# Patient Record
Sex: MALE | Race: WHITE | NOT HISPANIC OR LATINO | Employment: FULL TIME | URBAN - METROPOLITAN AREA
[De-identification: names, ages, dates, MRNs, and addresses within clinical notes are randomized per-mention and may not be internally consistent; named-entity substitution may affect disease eponyms.]

---

## 2023-05-04 ENCOUNTER — APPOINTMENT (OUTPATIENT)
Dept: CT IMAGING | Facility: CLINIC | Age: 63
DRG: 065 | End: 2023-05-04
Attending: EMERGENCY MEDICINE
Payer: COMMERCIAL

## 2023-05-04 ENCOUNTER — APPOINTMENT (OUTPATIENT)
Dept: CARDIOLOGY | Facility: CLINIC | Age: 63
DRG: 065 | End: 2023-05-04
Attending: PHYSICIAN ASSISTANT
Payer: COMMERCIAL

## 2023-05-04 ENCOUNTER — HOSPITAL ENCOUNTER (INPATIENT)
Facility: CLINIC | Age: 63
LOS: 1 days | Discharge: HOME | DRG: 065 | End: 2023-05-05
Attending: EMERGENCY MEDICINE | Admitting: HOSPITALIST
Payer: COMMERCIAL

## 2023-05-04 ENCOUNTER — APPOINTMENT (OUTPATIENT)
Dept: MRI IMAGING | Facility: CLINIC | Age: 63
DRG: 065 | End: 2023-05-04
Attending: EMERGENCY MEDICINE
Payer: COMMERCIAL

## 2023-05-04 DIAGNOSIS — I67.1 CEREBRAL ANEURYSM, NONRUPTURED: ICD-10-CM

## 2023-05-04 DIAGNOSIS — N40.1 BENIGN PROSTATIC HYPERPLASIA WITH LOWER URINARY TRACT SYMPTOMS, SYMPTOM DETAILS UNSPECIFIED: ICD-10-CM

## 2023-05-04 DIAGNOSIS — I10 BENIGN ESSENTIAL HYPERTENSION: ICD-10-CM

## 2023-05-04 DIAGNOSIS — I63.9 CEREBROVASCULAR ACCIDENT (CVA), UNSPECIFIED MECHANISM (H): Primary | ICD-10-CM

## 2023-05-04 DIAGNOSIS — I63.9 ACUTE STROKE OF BASAL GANGLIA (H): ICD-10-CM

## 2023-05-04 LAB
ALBUMIN SERPL BCG-MCNC: 4 G/DL (ref 3.5–5.2)
ALP SERPL-CCNC: 102 U/L (ref 40–129)
ALT SERPL W P-5'-P-CCNC: 17 U/L (ref 10–50)
ANION GAP SERPL CALCULATED.3IONS-SCNC: 8 MMOL/L (ref 7–15)
AST SERPL W P-5'-P-CCNC: 25 U/L (ref 10–50)
BASOPHILS # BLD AUTO: 0.1 10E3/UL (ref 0–0.2)
BASOPHILS NFR BLD AUTO: 1 %
BILIRUB DIRECT SERPL-MCNC: <0.2 MG/DL (ref 0–0.3)
BILIRUB SERPL-MCNC: 0.4 MG/DL
BUN SERPL-MCNC: 18.4 MG/DL (ref 8–23)
CALCIUM SERPL-MCNC: 8.8 MG/DL (ref 8.8–10.2)
CHLORIDE SERPL-SCNC: 98 MMOL/L (ref 98–107)
CHOLEST SERPL-MCNC: 150 MG/DL
CREAT SERPL-MCNC: 1.29 MG/DL (ref 0.67–1.17)
DEPRECATED HCO3 PLAS-SCNC: 27 MMOL/L (ref 22–29)
EOSINOPHIL # BLD AUTO: 0.1 10E3/UL (ref 0–0.7)
EOSINOPHIL NFR BLD AUTO: 1 %
ERYTHROCYTE [DISTWIDTH] IN BLOOD BY AUTOMATED COUNT: 13.1 % (ref 10–15)
GFR SERPL CREATININE-BSD FRML MDRD: 63 ML/MIN/1.73M2
GLUCOSE BLDC GLUCOMTR-MCNC: 107 MG/DL (ref 70–99)
GLUCOSE BLDC GLUCOMTR-MCNC: 109 MG/DL (ref 70–99)
GLUCOSE BLDC GLUCOMTR-MCNC: 109 MG/DL (ref 70–99)
GLUCOSE SERPL-MCNC: 102 MG/DL (ref 70–99)
HBA1C MFR BLD: 5.9 %
HCT VFR BLD AUTO: 47 % (ref 40–53)
HDLC SERPL-MCNC: 41 MG/DL
HGB BLD-MCNC: 15.5 G/DL (ref 13.3–17.7)
IMM GRANULOCYTES # BLD: 0 10E3/UL
IMM GRANULOCYTES NFR BLD: 0 %
LDLC SERPL CALC-MCNC: 96 MG/DL
LVEF ECHO: NORMAL
LYMPHOCYTES # BLD AUTO: 1.1 10E3/UL (ref 0.8–5.3)
LYMPHOCYTES NFR BLD AUTO: 15 %
MCH RBC QN AUTO: 30 PG (ref 26.5–33)
MCHC RBC AUTO-ENTMCNC: 33 G/DL (ref 31.5–36.5)
MCV RBC AUTO: 91 FL (ref 78–100)
MONOCYTES # BLD AUTO: 0.5 10E3/UL (ref 0–1.3)
MONOCYTES NFR BLD AUTO: 7 %
NEUTROPHILS # BLD AUTO: 5.6 10E3/UL (ref 1.6–8.3)
NEUTROPHILS NFR BLD AUTO: 76 %
NONHDLC SERPL-MCNC: 109 MG/DL
NRBC # BLD AUTO: 0 10E3/UL
NRBC BLD AUTO-RTO: 0 /100
PLATELET # BLD AUTO: 246 10E3/UL (ref 150–450)
POTASSIUM SERPL-SCNC: 4.4 MMOL/L (ref 3.4–5.3)
PROT SERPL-MCNC: 7.2 G/DL (ref 6.4–8.3)
RBC # BLD AUTO: 5.16 10E6/UL (ref 4.4–5.9)
SODIUM SERPL-SCNC: 133 MMOL/L (ref 136–145)
TRIGL SERPL-MCNC: 67 MG/DL
TROPONIN T SERPL HS-MCNC: 22 NG/L
TSH SERPL DL<=0.005 MIU/L-ACNC: 0.69 UIU/ML (ref 0.3–4.2)
WBC # BLD AUTO: 7.4 10E3/UL (ref 4–11)

## 2023-05-04 PROCEDURE — 84443 ASSAY THYROID STIM HORMONE: CPT | Performed by: PHYSICIAN ASSISTANT

## 2023-05-04 PROCEDURE — 258N000003 HC RX IP 258 OP 636: Performed by: EMERGENCY MEDICINE

## 2023-05-04 PROCEDURE — 82040 ASSAY OF SERUM ALBUMIN: CPT | Performed by: PHYSICIAN ASSISTANT

## 2023-05-04 PROCEDURE — 83036 HEMOGLOBIN GLYCOSYLATED A1C: CPT | Performed by: PHYSICIAN ASSISTANT

## 2023-05-04 PROCEDURE — 99223 1ST HOSP IP/OBS HIGH 75: CPT | Mod: AI | Performed by: PHYSICIAN ASSISTANT

## 2023-05-04 PROCEDURE — 99285 EMERGENCY DEPT VISIT HI MDM: CPT | Mod: 25

## 2023-05-04 PROCEDURE — 99207 PR NO BILLABLE SERVICE THIS VISIT: CPT | Performed by: NURSE PRACTITIONER

## 2023-05-04 PROCEDURE — 82962 GLUCOSE BLOOD TEST: CPT

## 2023-05-04 PROCEDURE — 250N000013 HC RX MED GY IP 250 OP 250 PS 637: Performed by: PHYSICIAN ASSISTANT

## 2023-05-04 PROCEDURE — 250N000013 HC RX MED GY IP 250 OP 250 PS 637: Performed by: EMERGENCY MEDICINE

## 2023-05-04 PROCEDURE — 70450 CT HEAD/BRAIN W/O DYE: CPT

## 2023-05-04 PROCEDURE — 36415 COLL VENOUS BLD VENIPUNCTURE: CPT | Performed by: EMERGENCY MEDICINE

## 2023-05-04 PROCEDURE — 0042T CT HEAD PERFUSION W CONTRAST: CPT

## 2023-05-04 PROCEDURE — 93005 ELECTROCARDIOGRAM TRACING: CPT

## 2023-05-04 PROCEDURE — 70498 CT ANGIOGRAPHY NECK: CPT

## 2023-05-04 PROCEDURE — 93306 TTE W/DOPPLER COMPLETE: CPT | Mod: 26 | Performed by: INTERNAL MEDICINE

## 2023-05-04 PROCEDURE — 80061 LIPID PANEL: CPT | Performed by: PHYSICIAN ASSISTANT

## 2023-05-04 PROCEDURE — G0378 HOSPITAL OBSERVATION PER HR: HCPCS

## 2023-05-04 PROCEDURE — 70551 MRI BRAIN STEM W/O DYE: CPT

## 2023-05-04 PROCEDURE — 120N000001 HC R&B MED SURG/OB

## 2023-05-04 PROCEDURE — 96360 HYDRATION IV INFUSION INIT: CPT | Mod: 59

## 2023-05-04 PROCEDURE — 93306 TTE W/DOPPLER COMPLETE: CPT

## 2023-05-04 PROCEDURE — 70496 CT ANGIOGRAPHY HEAD: CPT

## 2023-05-04 PROCEDURE — 96361 HYDRATE IV INFUSION ADD-ON: CPT

## 2023-05-04 PROCEDURE — 258N000003 HC RX IP 258 OP 636: Performed by: PHYSICIAN ASSISTANT

## 2023-05-04 PROCEDURE — 85025 COMPLETE CBC W/AUTO DIFF WBC: CPT | Performed by: EMERGENCY MEDICINE

## 2023-05-04 PROCEDURE — 84484 ASSAY OF TROPONIN QUANT: CPT | Performed by: EMERGENCY MEDICINE

## 2023-05-04 PROCEDURE — 250N000011 HC RX IP 250 OP 636: Performed by: EMERGENCY MEDICINE

## 2023-05-04 PROCEDURE — 80048 BASIC METABOLIC PNL TOTAL CA: CPT | Performed by: EMERGENCY MEDICINE

## 2023-05-04 RX ORDER — ASPIRIN 300 MG/1
300 SUPPOSITORY RECTAL DAILY
Status: DISCONTINUED | OUTPATIENT
Start: 2023-05-05 | End: 2023-05-05 | Stop reason: HOSPADM

## 2023-05-04 RX ORDER — ONDANSETRON 4 MG/1
4 TABLET, ORALLY DISINTEGRATING ORAL EVERY 6 HOURS PRN
Status: DISCONTINUED | OUTPATIENT
Start: 2023-05-04 | End: 2023-05-05 | Stop reason: HOSPADM

## 2023-05-04 RX ORDER — TAMSULOSIN HYDROCHLORIDE 0.4 MG/1
0.4 CAPSULE ORAL DAILY
Status: DISCONTINUED | OUTPATIENT
Start: 2023-05-04 | End: 2023-05-05 | Stop reason: HOSPADM

## 2023-05-04 RX ORDER — LISINOPRIL AND HYDROCHLOROTHIAZIDE 12.5; 2 MG/1; MG/1
1 TABLET ORAL DAILY
Status: DISCONTINUED | OUTPATIENT
Start: 2023-05-04 | End: 2023-05-05 | Stop reason: HOSPADM

## 2023-05-04 RX ORDER — ASPIRIN 81 MG/1
324 TABLET, CHEWABLE ORAL DAILY
Status: DISCONTINUED | OUTPATIENT
Start: 2023-05-05 | End: 2023-05-05 | Stop reason: HOSPADM

## 2023-05-04 RX ORDER — ACETAMINOPHEN 325 MG/1
650 TABLET ORAL EVERY 4 HOURS PRN
Status: DISCONTINUED | OUTPATIENT
Start: 2023-05-04 | End: 2023-05-05 | Stop reason: HOSPADM

## 2023-05-04 RX ORDER — ONDANSETRON 2 MG/ML
4 INJECTION INTRAMUSCULAR; INTRAVENOUS EVERY 6 HOURS PRN
Status: DISCONTINUED | OUTPATIENT
Start: 2023-05-04 | End: 2023-05-05 | Stop reason: HOSPADM

## 2023-05-04 RX ORDER — ATORVASTATIN CALCIUM 40 MG/1
40 TABLET, FILM COATED ORAL EVERY EVENING
Status: DISCONTINUED | OUTPATIENT
Start: 2023-05-04 | End: 2023-05-05 | Stop reason: HOSPADM

## 2023-05-04 RX ORDER — LIDOCAINE 40 MG/G
CREAM TOPICAL
Status: DISCONTINUED | OUTPATIENT
Start: 2023-05-04 | End: 2023-05-05 | Stop reason: HOSPADM

## 2023-05-04 RX ORDER — IOPAMIDOL 755 MG/ML
500 INJECTION, SOLUTION INTRAVASCULAR ONCE
Status: COMPLETED | OUTPATIENT
Start: 2023-05-04 | End: 2023-05-04

## 2023-05-04 RX ADMIN — IOPAMIDOL 125 ML: 755 INJECTION, SOLUTION INTRAVENOUS at 10:10

## 2023-05-04 RX ADMIN — ATORVASTATIN CALCIUM 40 MG: 40 TABLET, FILM COATED ORAL at 20:02

## 2023-05-04 RX ADMIN — SODIUM CHLORIDE 100 ML: 9 INJECTION, SOLUTION INTRAVENOUS at 10:10

## 2023-05-04 RX ADMIN — ASPIRIN 325 MG: 325 TABLET, COATED ORAL at 11:45

## 2023-05-04 RX ADMIN — LISINOPRIL AND HYDROCHLOROTHIAZIDE 1 TABLET: 12.5; 2 TABLET ORAL at 16:44

## 2023-05-04 RX ADMIN — TAMSULOSIN HYDROCHLORIDE 0.4 MG: 0.4 CAPSULE ORAL at 16:44

## 2023-05-04 RX ADMIN — SODIUM CHLORIDE 1000 ML: 9 INJECTION, SOLUTION INTRAVENOUS at 15:22

## 2023-05-04 ASSESSMENT — ACTIVITIES OF DAILY LIVING (ADL)
ADLS_ACUITY_SCORE: 18
ADLS_ACUITY_SCORE: 18
ADLS_ACUITY_SCORE: 35
ADLS_ACUITY_SCORE: 18
ADLS_ACUITY_SCORE: 18

## 2023-05-04 ASSESSMENT — VISUAL ACUITY: OU: NORMAL ACUITY

## 2023-05-04 NOTE — H&P
[facility]    History and Physical - Hospitalist Service       Date of Admission:  5/4/2023    Assessment & Plan      Abdifatah Zimmerman is a 62 year old male admitted on 5/4/2023 with acute CVA. He has not sought out medical care for ~20 yrs. He is a smoker, 1/4 pack per day.    1. Acute CVA  Developed R facial droop 2 days ago with slurred speech. Denies one sided weakness or vision change. CT head shows several areas concerning for age indeterminate infarcts. CTA head and neck is negative for high grade stenosis or large vessel occlusion, poss small aneurysm noted to L internal carotid artery, and 40-50% stenosis of the prox R internal carotid artery. CT head perfusion study also abnormal. Brain MRI obtained which does show small acute to subacute infarct to deep left frontal corona radiata. MRI also shows multiple chronic infarcts.   Pt reports hx of one sided weakness causing a fall some time ago. This resolved on its own and he did not seek medical evaluation.   Blood pressure elevated here, suspect underlying HTN. Also a smoker, 1/4 pack per day. Hx of heavy alcohol use, little use in past 20 yrs. Cr elevated, suspect CKD.  - admit to Observation  - sx ongoing for >48 hrs, no indication for thrombolysis   - stroke neurology consult  - monitor on tele  - echocardiogram  - start full dose ASA per neurology recommendation   - start statin, add on lipid panel  - HgbA1c add on, 5.9.   - troponin within normal limits  - blood pressure control. Will start Lisinopril/HCTZ 20mg/12/5 mg, suspect Cr is chronic and at baseline. Will need close monitoring as an outpatient to ensure stability. Will give 1L of NS overnight in case Cr is acutely elevated and due to hyponatremia.   - PT/OT consults  - encourage smoking cessation   - will need to establish care with PCP, SW consult     2. GUERRERO vs CKD  Cr 1.29. pt has not sought medical care for over 20 yrs, does not endorse acute volume loss with diarrhea,  vomiting or blood loss. BP elevated, suspect underlying HTN and hypertensive kidney disease.   - will give 1L NS in case this is acute and reversible.   - starting Lisinopril/HCTZ for HTN, monitor Cr closely  - avoid NSAIDs  - encourage smoking cessation     3. HTN  Suspect underlying HTN. Sx have been ongoing for 2 days, no need for permissive HTN in setting of acute stroke.   - start Lisinopril/HCTZ 20/12.5 (starting with lower dose of hydrochlorothiazide due to hyponatremia). Will likely need titration of antihypertensive as an outpatient   4. Hyponatremia  Will give 1L NS over 10 hrs, recheck in AM  5. Suspect BPH  Pt endorses hx of incomplete emptying and difficulty with urinary control. Suspect BPH. Pt willing to start Flomax now, which would also help with HTN, but understands he needs formal evaluation and work up with PCP as an outpatient.   - start Flomax  - PVRs  6. Tobacco use  Smokes 1/4 pack per day. Encourage cessation. Declines nicotine patch       Diet: Combination Diet Regular Diet    DVT Prophylaxis: Low Risk/Ambulatory with no VTE prophylaxis indicated  Olvera Catheter: Not present  Lines: None     Cardiac Monitoring: ACTIVE order. Indication: Stroke, acute (48 hours)  Code Status: Full Code      Clinically Significant Risk Factors Present on Admission                               Disposition Plan      Expected Discharge Date: 05/05/2023                The patient's care was discussed with the Attending Physician, Dr. Mancilla, Patient and ED provider, Dr. Snell.    Monika Oliveira PA-C  Hospitalist Service  [facility]  Securely message with [employer] (more info)  Text page via [facility] Paging/Directory     ______________________________________________________________________    Chief Complaint   Facial droop    History is obtained from the patient    History of Present Illness   Abdifatah Zimmerman is a 62 year old male who who presents to the ED with right facial droop.   Patient states he developed a right facial droop with slurred speech on Tuesday.  He thought it would go away on its own and did not seek immediate medical attention.  He was seen in clinic today for his symptoms and referred to the ED for concerns of an acute stroke.  The patient denies vision loss, swallowing difficulties, or one-sided weakness.  He denies recent fever, chills, chest pain, palpitations, shortness of breath, abdominal pain, nausea, vomiting, diarrhea or dysuria.  He does note recent issues with incomplete bladder emptying and difficulty controlling his urine.  He has not sought medical care for 20 years so he has no diagnosed medical conditions.  Quite some time ago, he does endorse an episode of one-sided weakness that did cause him to fall.  He states his symptoms just resolved on their own and he did not seek medical attention at that time.  He notes a family history of coronary artery disease in his grandfather and lung cancer in his mother.  He is a smoker, he smokes 1 pack every 4 days.  He endorses a remote history of heavy alcohol use 20 years ago but now only rarely drinks.  In the ED, his blood pressure is elevated, 195/117, vital signs otherwise stable.  BMP shows a sodium of 133, creatinine of 1.29, otherwise unremarkable.  CBC is unremarkable.  A code stroke was called, CT of the head showed multiple small foci of hypoattenuation involving the bilateral basal ganglia regions in the cerebral white matter concerning for age-indeterminate ischemia or infarct.  CTA of the head and neck shows no high-grade stenosis or large vessel occlusion, a small possible aneurysm of the left internal carotid artery noted, 40-50% stenosis of the proximal right internal carotid artery noted, no significant stenosis of the left carotid artery.  CT head perfusion study does show apparent asymmetric prolongation of contrast transit in the superior aspect of the right cerebellar hemisphere on pretreatment  imaging which is likely artifact but unable to rule out ischemia.  MRI of the brain was obtained which did show small acute to early subacute ischemic infarcts involving the deep left frontal white matter/corona radiata, several chronic infarcts also noted.  Stroke neurology was contacted and recommended full dose aspirin and no acute intervention required given symptoms have been present for greater than 48 hours.  He will be admitted to observation for further work-up and management of acute CVA.      Past Medical History    No past medical history on file. No known medical hx    Past Surgical History   No past surgical history on file. No known surgical hx    Prior to Admission Medications   None        Family History     Grandfather - CAD  Mother - lung ca       Physical Exam   Vital Signs: Temp: 97.9  F (36.6  C) Temp src: Oral BP: (!) 176/107 Pulse: 74   Resp: 20 SpO2: 94 % O2 Device: None (Room air)    Weight: 0 lbs 0 oz    GENERAL:  Comfortable.  PSYCH: pleasant, oriented, No acute distress.  HEENT:  Atraumatic, R facial droop. Normal conjunctiva, normal hearing, and oropharynx is normal. Speech is slurred  NECK:  Supple, no neck vein distention.  HEART:  Normal S1, S2 with no murmur, no pericardial rub, gallops or S3 or S4.  LUNGS:  Clear to auscultation, normal Respiratory effort. No wheezing, rales or ronchi.  GI:  Soft, normal bowel sounds. Non-tender, non distended.   EXTREMITIES:  No pedal edema, +2 pulses bilateral and equal.  SKIN:  Dry to touch, No rash, wound or ulcerations.  NEUROLOGIC:  R facial droop and slurred speech, otherwise CN 2-12 intact, BL 5/5 symmetric upper and lower extremity strength, sensation is intact with no focal deficits.      Medical Decision Making       75+ MINUTES SPENT BY ME on the date of service doing chart review, history, exam, documentation & further activities per the note.      Data     I have personally reviewed the following data over the past 24 hrs:    7.4  \    15.5   / 246     133 (L) 98 18.4 /  102 (H)   4.4 27 1.29 (H) \       ALT: 17 AST: 25 AP: 102 TBILI: 0.4   ALB: 4.0 TOT PROTEIN: 7.2 LIPASE: N/A       Trop: 22 BNP: N/A       TSH: 0.69 T4: N/A A1C: 5.9 (H)       Imaging results reviewed over the past 24 hrs:   Recent Results (from the past 24 hour(s))   Head CT w/o contrast    Narrative    CT SCAN OF THE HEAD WITHOUT CONTRAST May 4, 2023 10:15 AM     HISTORY: Stroke.    TECHNIQUE: Axial images of the head and coronal reformations without  IV contrast material. Radiation dose for this scan was reduced using  automated exposure control, adjustment of the mA and/or kV according  to patient size, or iterative reconstruction technique.    COMPARISON: None.    FINDINGS: The ventricles are normal in size and configuration.  Multiple small foci of hypoattenuation are seen in the bilateral basal  ganglia region and also involving the corona radiata and  retrolenticular right internal capsule region, concerning for  age-indeterminate infarcts. Couple of small areas of hypoattenuation  also noted involving the bilateral corona radiata/centrum semiovale  (series 3 image 20), concerning for age-indeterminate ischemic  change/infarct. Elsewhere, mild patchy hypoattenuation in the cerebral  white matter likely representing chronic small vessel ischemic  disease. Mild generalized brain atrophy. No acute intracranial  hemorrhage, extra axial fluid collection, or mass effect.    Mild mucosal thickening in the paranasal sinuses. The mastoid air  cells are clear. The calvarium appears intact.      Impression    IMPRESSION:  1. Multiple small foci of hypoattenuation involving the bilateral  basal ganglia regions and the cerebral white matter, as described,  concerning for age-indeterminate ischemia/infarcts. MRI is recommended  for further evaluation.  2. Brain atrophy and presumed chronic small vessel ischemic change, as  described.  3. No acute intracranial hemorrhage, extra axial  fluid collection, or  mass effect.    KISHAN HEATH MD         SYSTEM ID:  [id number]   CTA Head Neck with Contrast    Narrative    CT ANGIOGRAM OF THE HEAD AND NECK WITH CONTRAST  5/4/2023 10:19 AM     HISTORY: Code stroke.    TECHNIQUE: CT angiography with an injection of 125mL Isovue-370 (CTA  Head Neck 75ml and Perfusion 50ml, accession numbers [id number] and  [id number]) IV with scans through the head and neck. Images were  transferred to a separate 3-D workstation where multiplanar  reformations and 3-D images were created. Estimates of carotid  stenoses are made relative to the distal internal carotid artery  diameters except as noted. Radiation dose for this scan was reduced  using automated exposure control, adjustment of the mA and/or kV  according to patient size, or iterative reconstruction technique.     Perfusion scans were performed with injection of additional IV  contrast. These images were processed on a separate 3-D workstation.     COMPARISON: CT head same date.     CT ANGIOGRAM HEAD FINDINGS: The intracranial internal carotid  arteries, the major proximal branches of the anterior cerebral and  middle cerebral arteries, the vertebral arteries, the basilar artery,  and the major proximal branches of the posterior cerebral arteries  appear patent without flow-limiting stenosis. No large vessel  occlusion identified.    Small inferomedially directed outpouching involving the mid cavernous  segment of the left internal carotid artery measuring approximately  2-3 mm in height, concerning for possible small saccular aneurysm. No  other findings concerning for intracranial aneurysm or high flow  vascular malformation. Expected enhancement of the major dural venous  sinuses.    CT ANGIOGRAM NECK FINDINGS:   Conventional three-vessel aortic arch branching pattern. The origins  of the brachiocephalic and left common carotid arteries are not  included within the field-of-view. The origin of the left  subclavian  artery is patent. The proximal visualized aspects of the  brachiocephalic and left common carotid arteries are patent.    Right carotid artery: There is atherosclerosis involving the carotid  bifurcation and proximal internal carotid artery contributing to  approximately 40-50% stenosis of the proximal internal carotid artery  by NASCET criteria.    Left carotid artery: The left common and internal carotid arteries are  patent. Mild atherosclerotic disease at the carotid bifurcation and  proximal internal carotid artery without significant stenosis by  NASCET criteria.     Vertebral arteries: Vertebral arteries are patent without evidence of  dissection. No significant stenosis.     Other findings: Partially visualized centrilobular and paraseptal  emphysema involving the bilateral upper pulmonary lobes. Multilevel  degenerative changes in the spine.    CT HEAD PERFUSION FINDINGS: There are apparent artifacts in the  posterior fossa that somewhat limit evaluation. Apparent asymmetric  prolongation of contrast transit to the superior aspect of the right  cerebellar hemisphere is favored to be artifact. Otherwise, no  definite focal or regional cerebral perfusion asymmetry is identified.      Impression    IMPRESSION:     CTA HEAD:  1. No high-grade stenosis or large vessel occlusion involving the  major intracranial arteries.  2. Small (approximately 2-3 mm) inferomedially directed focal  outpouching of the cavernous left internal carotid artery, concerning  for possible small aneurysm.    CTA NECK:  1. Approximately 40-50% stenosis of the proximal right internal  carotid artery by NASCET criteria due to atherosclerosis.  2. Atherosclerosis of the left carotid bifurcation without significant  stenosis.  3. Patent cervical vertebral arteries without significant stenosis.  4. Emphysema.    CT HEAD PERFUSION:  1. Apparent asymmetric prolongation of contrast transit in the  superior aspect of the right  cerebellar hemisphere on perfusion  imaging, thought most likely be artifact. Ischemia less likely.  Perfusion imaging otherwise unremarkable.    Findings discussed by myself with Dr. Snell at 10:40 AM on 5/4/2023.    KISHAN HEATH MD         SYSTEM ID:  [id number]   CT Head Perfusion w Contrast    Narrative    CT ANGIOGRAM OF THE HEAD AND NECK WITH CONTRAST  5/4/2023 10:19 AM     HISTORY: Code stroke.    TECHNIQUE: CT angiography with an injection of 125mL Isovue-370 (CTA  Head Neck 75ml and Perfusion 50ml, accession numbers [id number] and  [id number]) IV with scans through the head and neck. Images were  transferred to a separate 3-D workstation where multiplanar  reformations and 3-D images were created. Estimates of carotid  stenoses are made relative to the distal internal carotid artery  diameters except as noted. Radiation dose for this scan was reduced  using automated exposure control, adjustment of the mA and/or kV  according to patient size, or iterative reconstruction technique.     Perfusion scans were performed with injection of additional IV  contrast. These images were processed on a separate 3-D workstation.     COMPARISON: CT head same date.     CT ANGIOGRAM HEAD FINDINGS: The intracranial internal carotid  arteries, the major proximal branches of the anterior cerebral and  middle cerebral arteries, the vertebral arteries, the basilar artery,  and the major proximal branches of the posterior cerebral arteries  appear patent without flow-limiting stenosis. No large vessel  occlusion identified.    Small inferomedially directed outpouching involving the mid cavernous  segment of the left internal carotid artery measuring approximately  2-3 mm in height, concerning for possible small saccular aneurysm. No  other findings concerning for intracranial aneurysm or high flow  vascular malformation. Expected enhancement of the major dural venous  sinuses.    CT ANGIOGRAM NECK FINDINGS:   Conventional  three-vessel aortic arch branching pattern. The origins  of the brachiocephalic and left common carotid arteries are not  included within the field-of-view. The origin of the left subclavian  artery is patent. The proximal visualized aspects of the  brachiocephalic and left common carotid arteries are patent.    Right carotid artery: There is atherosclerosis involving the carotid  bifurcation and proximal internal carotid artery contributing to  approximately 40-50% stenosis of the proximal internal carotid artery  by NASCET criteria.    Left carotid artery: The left common and internal carotid arteries are  patent. Mild atherosclerotic disease at the carotid bifurcation and  proximal internal carotid artery without significant stenosis by  NASCET criteria.     Vertebral arteries: Vertebral arteries are patent without evidence of  dissection. No significant stenosis.     Other findings: Partially visualized centrilobular and paraseptal  emphysema involving the bilateral upper pulmonary lobes. Multilevel  degenerative changes in the spine.    CT HEAD PERFUSION FINDINGS: There are apparent artifacts in the  posterior fossa that somewhat limit evaluation. Apparent asymmetric  prolongation of contrast transit to the superior aspect of the right  cerebellar hemisphere is favored to be artifact. Otherwise, no  definite focal or regional cerebral perfusion asymmetry is identified.      Impression    IMPRESSION:     CTA HEAD:  1. No high-grade stenosis or large vessel occlusion involving the  major intracranial arteries.  2. Small (approximately 2-3 mm) inferomedially directed focal  outpouching of the cavernous left internal carotid artery, concerning  for possible small aneurysm.    CTA NECK:  1. Approximately 40-50% stenosis of the proximal right internal  carotid artery by NASCET criteria due to atherosclerosis.  2. Atherosclerosis of the left carotid bifurcation without significant  stenosis.  3. Patent cervical  vertebral arteries without significant stenosis.  4. Emphysema.    CT HEAD PERFUSION:  1. Apparent asymmetric prolongation of contrast transit in the  superior aspect of the right cerebellar hemisphere on perfusion  imaging, thought most likely be artifact. Ischemia less likely.  Perfusion imaging otherwise unremarkable.    Findings discussed by myself with Dr. Snell at 10:40 AM on 5/4/2023.    KISHAN HEATH MD         SYSTEM ID:  [id number]   MR Brain w/o Contrast    Narrative    MRI BRAIN WITHOUT CONTRAST May 4, 2023 2:20 PM    HISTORY: Stroke.    TECHNIQUE: Multiplanar, multisequence MRI of the brain without  gadolinium IV contrast material.      COMPARISON: CT of the head 5/4/2023.    FINDINGS: Small area of restricted diffusion with corresponding T2  FLAIR hyperintense signal involving the deep left frontal white  matter/corona radiata, consistent with an acute/early subacute  ischemic infarct. This measures 17 mm x 9 mm in axial plane. No other  areas of recent infarct identified.    The ventricles are normal in size and configuration. Mild generalized  brain parenchymal volume loss. Small foci of chronic ischemic  change/chronic infarcts in the bilateral basal ganglia region. There  may also be mildly prominent dilated perivascular spaces in the  bilateral basal ganglia region. Small area of presumed chronic  ischemic change in the right thalamus. Mild scattered patchy foci of  T2 FLAIR hyperintense signal elsewhere in the cerebral white matter  and mild to moderate patchy signal changes in the alma rosa, presumably  related to chronic small vessel ischemic disease. No intracranial  hemorrhage, extra axial fluid collection, or mass effect/herniation.  The major arterial flow voids of the skull base are grossly  maintained.    Mild to moderate mucosal thickening in the paranasal sinuses, most  pronounced in the ethmoid regions. The calvarium, skull base, and  midface otherwise appear unremarkable.      Impression     IMPRESSION:  1. Small acute/early subacute ischemic infarct involving the deep left  frontal white matter/corona radiata. No acute intracranial hemorrhage  or significant mass effect.  2. Brain atrophy and chronic ischemic changes/infarcts, as described.

## 2023-05-04 NOTE — ED NOTES
[facility]  ED Nurse Handoff Report    Abdiaftah Zimmerman is a 62 year old male   ED Chief complaint: Slurred Speech  . ED Diagnosis:   Final diagnoses:   Acute stroke of basal ganglia (H)     Allergies: No Known Allergies    Code Status: Full Code  Activity level - Baseline/Home:  Independent. Activity Level - Current:   Independent. Lift room needed: No. Bariatric: No   Needed: No   Isolation: No. Infection: Not Applicable.     Vital Signs:   Vitals:    05/04/23 1120 05/04/23 1135 05/04/23 1150 05/04/23 1212   BP: (!) 176/101 (!) 171/109 (!) 163/109    BP Location:       Cuff Size:       Pulse: 54 66 70 64   Resp:    19   Temp:       TempSrc:       SpO2: 93% 96% 95% 93%       Cardiac Rhythm:  ,      Pain level:    Patient confused: No. Patient Falls Risk: No.   Elimination Status: Has voided   Patient Report - Initial Complaint:   Pt arrives via ems. Pt went to the [facility] today for slurred speech and right sided facial droop that started Tuesday, 522/23,  around 0630 am. Pt was at work when he noticed the droop and was having difficultly speaking. Pt continued to work his shift with no additional neurological deficits. Pt called in sick yesterday hoping the speech and droop would improve, they have not.      A/O x4.  ABCs intact.       . Focused Assessment:    Tests Performed:   Abnormal Labs Resulted from Time of ED Arrival to Time of ED Departure   BASIC METABOLIC PANEL - Abnormal       Result Value    Sodium 133 (*)     Potassium 4.4      Chloride 98      Carbon Dioxide (CO2) 27      Anion Gap 8      Urea Nitrogen 18.4      Creatinine 1.29 (*)     Calcium 8.8      Glucose 102 (*)     GFR Estimate 63     GLUCOSE BY METER - Abnormal    GLUCOSE BY METER POCT 107 (*)       CT Head Perfusion w Contrast   Final Result   IMPRESSION:       CTA HEAD:   1. No high-grade stenosis or large vessel occlusion involving the   major intracranial arteries.   2. Small (approximately 2-3 mm)  inferomedially directed focal   outpouching of the cavernous left internal carotid artery, concerning   for possible small aneurysm.      CTA NECK:   1. Approximately 40-50% stenosis of the proximal right internal   carotid artery by NASCET criteria due to atherosclerosis.   2. Atherosclerosis of the left carotid bifurcation without significant   stenosis.   3. Patent cervical vertebral arteries without significant stenosis.   4. Emphysema.      CT HEAD PERFUSION:   1. Apparent asymmetric prolongation of contrast transit in the   superior aspect of the right cerebellar hemisphere on perfusion   imaging, thought most likely be artifact. Ischemia less likely.   Perfusion imaging otherwise unremarkable.      Findings discussed by myself with Dr. Snell at 10:40 AM on 5/4/2023.      KISHAN HEATH MD            SYSTEM ID:  [id number]      CTA Head Neck with Contrast   Final Result   IMPRESSION:       CTA HEAD:   1. No high-grade stenosis or large vessel occlusion involving the   major intracranial arteries.   2. Small (approximately 2-3 mm) inferomedially directed focal   outpouching of the cavernous left internal carotid artery, concerning   for possible small aneurysm.      CTA NECK:   1. Approximately 40-50% stenosis of the proximal right internal   carotid artery by NASCET criteria due to atherosclerosis.   2. Atherosclerosis of the left carotid bifurcation without significant   stenosis.   3. Patent cervical vertebral arteries without significant stenosis.   4. Emphysema.      CT HEAD PERFUSION:   1. Apparent asymmetric prolongation of contrast transit in the   superior aspect of the right cerebellar hemisphere on perfusion   imaging, thought most likely be artifact. Ischemia less likely.   Perfusion imaging otherwise unremarkable.      Findings discussed by myself with Dr. Snell at 10:40 AM on 5/4/2023.      KISHAN HEATH MD            SYSTEM ID:  [id number]      Head CT w/o contrast   Final Result   IMPRESSION:    1. Multiple small foci of hypoattenuation involving the bilateral   basal ganglia regions and the cerebral white matter, as described,   concerning for age-indeterminate ischemia/infarcts. MRI is recommended   for further evaluation.   2. Brain atrophy and presumed chronic small vessel ischemic change, as   described.   3. No acute intracranial hemorrhage, extra axial fluid collection, or   mass effect.      KISHAN HEATH MD            SYSTEM ID:  [id number]      MR Brain w/o Contrast    (Results Pending)    . Abnormal Results: .   Treatments provided: ASA, waiting for MRI results.   Family Comments:   OBS brochure/video discussed/provided to patient:  Yes  ED Medications:   Medications   0.9% sodium chloride BOLUS (100 mLs Intravenous $New Bag 5/4/23 1010)   iopamidol (ISOVUE-370) solution 500 mL (125 mLs Intravenous $Given 5/4/23 1010)   aspirin (ASA) EC tablet 325 mg (325 mg Oral $Given 5/4/23 1145)     Drips infusing:  No  For the majority of the shift, the patient's behavior Green. Interventions performed were .    Sepsis treatment initiated: No     Patient tested for COVID 19 prior to admission: NO    ED Nurse Name/Phone Number: Juan Morel RN,   2:20 PM    RECEIVING UNIT ED HANDOFF REVIEW    Above ED Nurse Handoff Report was reviewed: Yes  Reviewed by: Haydee Sanchez RN on May 4, 2023 at 3:41 PM

## 2023-05-04 NOTE — CONSULTS
[facility]    Stroke Telephone Note    I was called by August Snell on 05/04/23 regarding patient Abdifatah Zimmerman. The patient is a 62 year old male with unknown past medical history who presented to the ED 5/4/23 for evaluation of right facial droop and dysarthria present since 5/2. No other reports of focal neurologic symptoms per ED. Presenting /117.     Stroke Code Data (for stroke code without tele)  Stroke code activated 05/04/23   1001   Stroke provider first response           05/04/23   1004   Last known normal 05/02/23        Unknown   Time of discovery   (or onset of symptoms) 05/02/23    (unknown)   Head CT read by Stroke Neuro Dr/Provider 05/04/23   1017   Was stroke code de-escalated? Yes 05/04/23 1030          Imaging Findings   CTH negative for acute pathology. Noted to have multiple small foci of hypoattenuation involving the bilateral basal ganglia regions and the cerebral white matter, as described,  concerning for age-indeterminate ischemia/infarcts. MRI is recommended  for further evaluation.    CTA HEAD:  1. No high-grade stenosis or large vessel occlusion involving the  major intracranial arteries.  2. Small (approximately 2-3 mm) inferomedially directed focal  outpouching of the cavernous left internal carotid artery, concerning  for possible small aneurysm.     CTA NECK:  1. Approximately 40-50% stenosis of the proximal right internal  carotid artery by NASCET criteria due to atherosclerosis.  2. Atherosclerosis of the left carotid bifurcation without significant  Stenosis.    Intravenous Thrombolysis  Not given due to:   - unclear or unfavorable risk-benefit profile for extended window thrombolysis beyond the conventional 4.5 hour time window    Endovascular Treatment  Not initiated due to absence of proximal vessel occlusion    Impression  Right facial droop and dysarthria, suspect acute stroke.     Recommendations   - Q4 hour neuro checks   -   "mg daily. Consideration for Plavix if significant stenosis noted on CTA  - Given symptoms >48 hours, no indication for permissive HTN. Okay to begin down titration to goal <140/90  - LDL, A1c, TTE  - Telemetry  - MRI brain w/wo  - Therapies  - PLC  - Please place stroke neurology consult on admission    My recommendations are based on the information provided over the phone by Abdifatah Zimmerman's in-person providers. They are not intended to replace the clinical judgment of his in-person providers. I was not requested to personally see or examine the patient at this time.    Starla Longoria, CNP  Vascular Neurology    To page me or covering stroke neurology team member, click here: AMCOM  Choose \"On Call\" tab at top, then select \"NEUROLOGY/ALL SITES\" from middle drop-down box, press Enter, then look for \"stroke\" or \"telestroke\" for your site.           "

## 2023-05-04 NOTE — UTILIZATION REVIEW
Admission Status; Secondary Review Determination    Under the authority of the Utilization Management Committee, the utilization review process indicated a secondary review on the above patient. The review outcome is based on review of the medical records, discussions with staff, and applying clinical experience noted on the date of the review.    (x) Inpatient Status Appropriate - This patient's medical care is consistent with medical management for inpatient care and reasonable inpatient medical practice.    RATIONALE FOR DETERMINATION:62-year-old male presents to the hospital with a 2-day history of right facial droop and dysarthria.  Patient with marked severe hypertension felt to be secondary to acute stroke changes along with MRI revealing small acute ischemic infarct involving the deep left frontal white matter/corona radiata.  Inpatient care appropriate for evaluation and management of acute infarct complicated by severe hypertension.    At the time of admission with the information available to the attending physician more than 2 nights Hospital complex care was anticipated, based on patient risk of adverse outcome if treated as outpatient and complex care required. Inpatient admission is appropriate based on the Medicare guidelines.    This document was produced using voice recognition software    The information on this document is developed by the utilization review team in order for the business office to ensure compliance. This only denotes the appropriateness of proper admission status and does not reflect the quality of care rendered.    The definitions of Inpatient Status and Observation Status used in making the determination above are those provided in the CMS Coverage Manual, Chapter 1 and Chapter 6, section 70.4.    Sincerely,    Amol Llanes MD  Utilization Review  Physician Advisor  [facility].

## 2023-05-04 NOTE — ED TRIAGE NOTES
Pt arrives via ems. Pt went to the [facility] today for slurred speech and right sided facial droop that started Tuesday, 522/23,  around 0630 am. Pt was at work when he noticed the droop and was having difficultly speaking. Pt continued to work his shift with no additional neurological deficits. Pt called in sick yesterday hoping the speech and droop would improve, they have not.     A/O x4.  ABCs intact.

## 2023-05-04 NOTE — PLAN OF CARE
ROOM # [id number]    Living Situation (if not independent, order SW consult):  Facility name:  : pt doesn't want to list , pt lives with his cousin in [place]    Activity level at baseline: Ind  Activity level on admit: SBA    Who will be transporting you at discharge: cousin     Patient registered to observation; given Patient Bill of Rights; given the opportunity to ask questions about observation status and their plan of care.  Patient has been oriented to the observation room, bathroom and call light is in place.    Discussed discharge goals and expectations with patient/family.

## 2023-05-04 NOTE — PLAN OF CARE
[facility]    ED Boarding Nurse Handoff Addendum Report:    Date/time: 5/4/2023, 3:43 PM    Activity Level: Regular activity    Fall Risk: No    Active Infusions: IVF NS at 100cc/hr     Current Meds Due: Lisinopril-hydrochlorothiazide and flomax.     Current care needs: PVR needed.     Oxygen requirements (liters/min and/or FiO2): None     Respiratory status: Room air    Vital signs (within last 30 minutes):    Vitals:    05/04/23 1135 05/04/23 1150 05/04/23 1212 05/04/23 1453   BP: (!) 171/109 (!) 163/109  (!) 176/107   BP Location:    Right arm   Cuff Size:       Pulse: 66 70 64 74   Resp:   19 20   Temp:       TempSrc:       SpO2: 96% 95% 93% 94%       Focused assessment within last 30 minutes:    CMS and neuros intact. Still consistently has right sided facial droop and slurred speech.     ED Boarding Nurse name: Pratima Soni RN

## 2023-05-04 NOTE — PHARMACY-ADMISSION MEDICATION HISTORY
Pharmacist Admission Medication History    Admission medication history is complete. The information provided in this note is only as accurate as the sources available at the time of the update.    Medication reconciliation/reorder completed by provider prior to medication history? No    Information Source(s): Patient via in-person    Pertinent Information: Take no prescription or RX med products.     Changes made to PTA medication list:    Added: None    Deleted: None    Changed: None    Medication Affordability:       Allergies reviewed with patient and updates made in EHR: yes    Medication History Completed By: Harriett Diaz RPH 5/4/2023 1:10 PM    Prior to Admission medications    Not on File

## 2023-05-04 NOTE — ED PROVIDER NOTES
History     Chief Complaint:  Slurred Speech       HPI   Abdifatah Zimmerman is a 62 year old male who has not seen a doctor who presents with drooping of the right face and weakness.    Patient is a 62-year-old male without significant past medical history patient denies seeking medical attention for a number of years.  Patient states that the last 2 days he has noticed drooping of the right face and difficulty with his arm.  He is noted some weakness in  strength.  Also some abnormal allergies in his speech pattern.  Patient states he thought it would go away but presents to the emergency room for assessment.  Patient does smoke.  He works in a  shop.  He denies headache vomiting.  Symptoms have been persistent and presents 2 days into symptoms for assessment.      Independent Historian:   None - Patient Only    Review of External Notes:      ROS:  Review of Systems    Allergies:  No Known Allergies     Medications:    No current outpatient medications on file.      Past Medical History:    No past medical history on file.    Past Surgical History:    No past surgical history on file.     Family History:    family history is not on file.    Social History:     PCP: No primary care provider on file.     Physical Exam   Patient Vitals for the past 24 hrs:   BP Temp Temp src Pulse Resp SpO2   05/04/23 0956 (!) 195/117 97.9  F (36.6  C) Oral 70 18 94 %        Physical Exam  Vitals reviewed.   HENT:      Head: Normocephalic.      Mouth/Throat:      Mouth: Mucous membranes are moist.   Cardiovascular:      Rate and Rhythm: Normal rate.      Pulses: Normal pulses.   Pulmonary:      Effort: Pulmonary effort is normal.      Breath sounds: Normal breath sounds.   Abdominal:      General: Abdomen is flat.   Musculoskeletal:         General: Normal range of motion.   Skin:     General: Skin is warm.      Capillary Refill: Capillary refill takes less than 2 seconds.   Neurological:      Mental Status: He is alert and  oriented to person, place, and time.      Cranial Nerves: Cranial nerve deficit present.      Motor: Weakness present.   Psychiatric:         Mood and Affect: Mood normal.       NIHscale of 3 with a slight facial droop.  Pronator drift.  Speech is slow but understandable.  With a mild form of dysarthria.    Emergency Department Course       Imaging:  Echocardiogram Complete - For age > 60 yrs   Final Result      MR Brain w/o Contrast   Final Result   IMPRESSION:   1. Small acute/early subacute ischemic infarct involving the deep left   frontal white matter/corona radiata. No acute intracranial hemorrhage   or significant mass effect.   2. Brain atrophy and chronic ischemic changes/infarcts, as described.      KISHAN HEATH MD            SYSTEM ID:  [id number]      CT Head Perfusion w Contrast   Final Result   IMPRESSION:       CTA HEAD:   1. No high-grade stenosis or large vessel occlusion involving the   major intracranial arteries.   2. Small (approximately 2-3 mm) inferomedially directed focal   outpouching of the cavernous left internal carotid artery, concerning   for possible small aneurysm.      CTA NECK:   1. Approximately 40-50% stenosis of the proximal right internal   carotid artery by NASCET criteria due to atherosclerosis.   2. Atherosclerosis of the left carotid bifurcation without significant   stenosis.   3. Patent cervical vertebral arteries without significant stenosis.   4. Emphysema.      CT HEAD PERFUSION:   1. Apparent asymmetric prolongation of contrast transit in the   superior aspect of the right cerebellar hemisphere on perfusion   imaging, thought most likely be artifact. Ischemia less likely.   Perfusion imaging otherwise unremarkable.      Findings discussed by myself with Dr. Snell at 10:40 AM on 5/4/2023.      KISHAN HEATH MD            SYSTEM ID:  [id number]      CTA Head Neck with Contrast   Final Result   IMPRESSION:       CTA HEAD:   1. No high-grade stenosis or large vessel  occlusion involving the   major intracranial arteries.   2. Small (approximately 2-3 mm) inferomedially directed focal   outpouching of the cavernous left internal carotid artery, concerning   for possible small aneurysm.      CTA NECK:   1. Approximately 40-50% stenosis of the proximal right internal   carotid artery by NASCET criteria due to atherosclerosis.   2. Atherosclerosis of the left carotid bifurcation without significant   stenosis.   3. Patent cervical vertebral arteries without significant stenosis.   4. Emphysema.      CT HEAD PERFUSION:   1. Apparent asymmetric prolongation of contrast transit in the   superior aspect of the right cerebellar hemisphere on perfusion   imaging, thought most likely be artifact. Ischemia less likely.   Perfusion imaging otherwise unremarkable.      Findings discussed by myself with Dr. Snell at 10:40 AM on 5/4/2023.      KISHAN HEATH MD            SYSTEM ID:  [id number]      Head CT w/o contrast   Final Result   IMPRESSION:   1. Multiple small foci of hypoattenuation involving the bilateral   basal ganglia regions and the cerebral white matter, as described,   concerning for age-indeterminate ischemia/infarcts. MRI is recommended   for further evaluation.   2. Brain atrophy and presumed chronic small vessel ischemic change, as   described.   3. No acute intracranial hemorrhage, extra axial fluid collection, or   mass effect.      KISHAN HEATH MD            SYSTEM ID:  [id number]      Cardiac Event Monitor Adult/Pediatric    (Results Pending)      Report per radiology    Laboratory:  Labs Ordered and Resulted from Time of ED Arrival to Time of ED Departure   BASIC METABOLIC PANEL - Abnormal       Result Value    Sodium 133 (*)     Potassium 4.4      Chloride 98      Carbon Dioxide (CO2) 27      Anion Gap 8      Urea Nitrogen 18.4      Creatinine 1.29 (*)     Calcium 8.8      Glucose 102 (*)     GFR Estimate 63     GLUCOSE BY METER - Abnormal    GLUCOSE BY METER POCT  107 (*)    TROPONIN T, HIGH SENSITIVITY - Normal    Troponin T, High Sensitivity 22     CBC WITH PLATELETS AND DIFFERENTIAL    WBC Count 7.4      RBC Count 5.16      Hemoglobin 15.5      Hematocrit 47.0      MCV 91      MCH 30.0      MCHC 33.0      RDW 13.1      Platelet Count 246      % Neutrophils 76      % Lymphocytes 15      % Monocytes 7      % Eosinophils 1      % Basophils 1      % Immature Granulocytes 0      NRBCs per 100 WBC 0      Absolute Neutrophils 5.6      Absolute Lymphocytes 1.1      Absolute Monocytes 0.5      Absolute Eosinophils 0.1      Absolute Basophils 0.1      Absolute Immature Granulocytes 0.0      Absolute NRBCs 0.0              Emergency Department Course & Assessments:             Interventions:  Medications   aspirin (ASA) EC tablet 325 mg (has no administration in time range)   0.9% sodium chloride BOLUS (100 mLs Intravenous $New Bag 5/4/23 1010)   iopamidol (ISOVUE-370) solution 500 mL (125 mLs Intravenous $Given 5/4/23 1010)        Assessments:      Independent Interpretation (X-rays, CTs, rhythm strip):  None    Consultations/Discussion of Management or Tests:  None        Social Determinants of Health affecting care:   None    Disposition:  The patient was admitted to the hospital under the care of Dr. Mancilla    Impression & Plan You       Medical Decision Making:  Patient presents with 2 days of progressive symptoms that involve slight difficulty in speaking facial droop and arm weakness.  Clinical exam is consistent with stroke tier 2 stroke code was ascertained due to lack of ability to identify the need for TNK.  Patient's the CT CTA are negative there is some mild carotid stenosis but nothing intervenable.  Symptoms were de-escalated MRI was ordered patient was ordered loaded and asked severe aspirin and admitted for further work-up due to new stroke.  Care was discussed with the hospitalist due to high clinical suspicions of new stroke was admitted as an inpatient.    Critical  Care time:  was 0 minutes for this patient excluding procedures.    Diagnosis:    ICD-10-CM    1. Cerebrovascular accident (CVA), unspecified mechanism (H)  I63.9 aspirin (ASA) 325 MG EC tablet     atorvastatin (LIPITOR) 40 MG tablet     Speech Therapy Referral     Stroke Hospital Follow Up     Adult Sleep Eval & Management Referral      2. Acute stroke of basal ganglia (H)  I63.9 Speech Therapy Referral      3. Benign essential hypertension  I10 lisinopril-hydrochlorothiazide (ZESTORETIC) 10-12.5 MG tablet     DISCONTINUED: lisinopril-hydrochlorothiazide (ZESTORETIC) 10-12.5 MG tablet      4. Benign prostatic hyperplasia with lower urinary tract symptoms, symptom details unspecified  N40.1 tamsulosin (FLOMAX) 0.4 MG capsule      5. Cerebral aneurysm, nonruptured  I67.1 Neurosurgery Referral           Discharge Medications:  New Prescriptions    No medications on file          August Snell MD  5/4/2023   August Snell MD Goodman, Brian Samuel, MD  05/13/23 [id number]

## 2023-05-05 ENCOUNTER — APPOINTMENT (OUTPATIENT)
Dept: SPEECH THERAPY | Facility: CLINIC | Age: 63
DRG: 065 | End: 2023-05-05
Attending: PHYSICIAN ASSISTANT
Payer: COMMERCIAL

## 2023-05-05 ENCOUNTER — APPOINTMENT (OUTPATIENT)
Dept: PHYSICAL THERAPY | Facility: CLINIC | Age: 63
DRG: 065 | End: 2023-05-05
Attending: PHYSICIAN ASSISTANT
Payer: COMMERCIAL

## 2023-05-05 ENCOUNTER — APPOINTMENT (OUTPATIENT)
Dept: CARDIOLOGY | Facility: CLINIC | Age: 63
DRG: 065 | End: 2023-05-05
Attending: HOSPITALIST
Payer: COMMERCIAL

## 2023-05-05 VITALS
BODY MASS INDEX: 24.34 KG/M2 | RESPIRATION RATE: 18 BRPM | SYSTOLIC BLOOD PRESSURE: 137 MMHG | TEMPERATURE: 98.6 F | WEIGHT: 179.68 LBS | HEART RATE: 63 BPM | DIASTOLIC BLOOD PRESSURE: 76 MMHG | HEIGHT: 72 IN | OXYGEN SATURATION: 93 %

## 2023-05-05 LAB
ANION GAP SERPL CALCULATED.3IONS-SCNC: 8 MMOL/L (ref 7–15)
BUN SERPL-MCNC: 18.8 MG/DL (ref 8–23)
CALCIUM SERPL-MCNC: 8.7 MG/DL (ref 8.8–10.2)
CHLORIDE SERPL-SCNC: 101 MMOL/L (ref 98–107)
CREAT SERPL-MCNC: 1.38 MG/DL (ref 0.67–1.17)
DEPRECATED HCO3 PLAS-SCNC: 26 MMOL/L (ref 22–29)
ERYTHROCYTE [DISTWIDTH] IN BLOOD BY AUTOMATED COUNT: 13.2 % (ref 10–15)
GFR SERPL CREATININE-BSD FRML MDRD: 58 ML/MIN/1.73M2
GLUCOSE BLDC GLUCOMTR-MCNC: 115 MG/DL (ref 70–99)
GLUCOSE BLDC GLUCOMTR-MCNC: 88 MG/DL (ref 70–99)
GLUCOSE SERPL-MCNC: 97 MG/DL (ref 70–99)
HCT VFR BLD AUTO: 44.4 % (ref 40–53)
HGB BLD-MCNC: 14.8 G/DL (ref 13.3–17.7)
MCH RBC QN AUTO: 30.3 PG (ref 26.5–33)
MCHC RBC AUTO-ENTMCNC: 33.3 G/DL (ref 31.5–36.5)
MCV RBC AUTO: 91 FL (ref 78–100)
PLATELET # BLD AUTO: 256 10E3/UL (ref 150–450)
POTASSIUM SERPL-SCNC: 3.8 MMOL/L (ref 3.4–5.3)
RBC # BLD AUTO: 4.89 10E6/UL (ref 4.4–5.9)
SODIUM SERPL-SCNC: 135 MMOL/L (ref 136–145)
WBC # BLD AUTO: 7.6 10E3/UL (ref 4–11)

## 2023-05-05 PROCEDURE — 85014 HEMATOCRIT: CPT | Performed by: PHYSICIAN ASSISTANT

## 2023-05-05 PROCEDURE — 80048 BASIC METABOLIC PNL TOTAL CA: CPT | Performed by: PHYSICIAN ASSISTANT

## 2023-05-05 PROCEDURE — 92610 EVALUATE SWALLOWING FUNCTION: CPT | Mod: GN

## 2023-05-05 PROCEDURE — G0426 INPT/ED TELECONSULT50: HCPCS | Mod: G0 | Performed by: NURSE PRACTITIONER

## 2023-05-05 PROCEDURE — 93270 REMOTE 30 DAY ECG REV/REPORT: CPT

## 2023-05-05 PROCEDURE — 99239 HOSP IP/OBS DSCHRG MGMT >30: CPT | Performed by: HOSPITALIST

## 2023-05-05 PROCEDURE — 250N000013 HC RX MED GY IP 250 OP 250 PS 637: Performed by: PHYSICIAN ASSISTANT

## 2023-05-05 PROCEDURE — 93272 ECG/REVIEW INTERPRET ONLY: CPT | Performed by: INTERNAL MEDICINE

## 2023-05-05 PROCEDURE — 97161 PT EVAL LOW COMPLEX 20 MIN: CPT | Mod: GP | Performed by: PHYSICAL THERAPIST

## 2023-05-05 PROCEDURE — 36415 COLL VENOUS BLD VENIPUNCTURE: CPT | Performed by: PHYSICIAN ASSISTANT

## 2023-05-05 RX ORDER — LISINOPRIL/HYDROCHLOROTHIAZIDE 10-12.5 MG
1 TABLET ORAL DAILY
Qty: 60 TABLET | Refills: 1 | Status: SHIPPED | OUTPATIENT
Start: 2023-05-05 | End: 2023-05-05

## 2023-05-05 RX ORDER — ASPIRIN 325 MG
325 TABLET, DELAYED RELEASE (ENTERIC COATED) ORAL DAILY
Qty: 60 TABLET | Refills: 1 | Status: SHIPPED | OUTPATIENT
Start: 2023-05-05

## 2023-05-05 RX ORDER — ATORVASTATIN CALCIUM 40 MG/1
20 TABLET, FILM COATED ORAL EVERY EVENING
Qty: 60 TABLET | Refills: 1 | Status: SHIPPED | OUTPATIENT
Start: 2023-05-05

## 2023-05-05 RX ORDER — TAMSULOSIN HYDROCHLORIDE 0.4 MG/1
0.4 CAPSULE ORAL DAILY
Qty: 30 CAPSULE | Refills: 1 | Status: SHIPPED | OUTPATIENT
Start: 2023-05-05

## 2023-05-05 RX ORDER — LISINOPRIL/HYDROCHLOROTHIAZIDE 10-12.5 MG
1 TABLET ORAL DAILY
Qty: 60 TABLET | Refills: 1 | Status: SHIPPED | OUTPATIENT
Start: 2023-05-05

## 2023-05-05 RX ADMIN — ASPIRIN 325 MG: 325 TABLET, COATED ORAL at 09:09

## 2023-05-05 RX ADMIN — TAMSULOSIN HYDROCHLORIDE 0.4 MG: 0.4 CAPSULE ORAL at 09:09

## 2023-05-05 RX ADMIN — LISINOPRIL AND HYDROCHLOROTHIAZIDE 1 TABLET: 12.5; 2 TABLET ORAL at 09:09

## 2023-05-05 ASSESSMENT — ACTIVITIES OF DAILY LIVING (ADL)
ADLS_ACUITY_SCORE: 18

## 2023-05-05 NOTE — CONSULTS
[facility]    Stroke Consult Note    Reason for Consult:  stroke    Chief Complaint: Slurred Speech       HPI  Abdifatah Zimmerman is a 62 year old male with history significant for active tobacco use and suspected HTN. He has not sought medical care in ~20 years. He presented to the ED 5/4/23 for evaluation of right facial droop and dysarthria present since 5/2. He reports that he was talking at work and noticed that his speech was slurred. He also notes that he had an episode of right hemibody weakness about two years ago, with a more minor episode about a month after. He did not seek evaluation after this event.    Today on exam, he feels as though his right facial droop and dysarthria are stable. He reports no impairment with ambulation. He currently smokes 1 pack of cigarettes ever 3-4 days and has low motivation to quit. He was previously a heavy drinker but now does not consume alcohol.     Stroke Evaluation Summarized    MRI/Head CT MRI with small acute/early subacute ischemic infarct involving the deep left frontal white matter/corona radiata.    Intracranial Vasculature No significant stenosis. 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 Additionally, he reports two episodes of transient right hemiparesis two years ago that are concerning for TIA vs stroke. He did not seek evaluation following these events.     Recommendations  -  mg daily indefinitely. If atrial fibrillation is identified on cardionet/future  "monitoring, recommend initiation of full dose anticoagulation for secondary stroke prevention  - LDL 96, started on Lipitor 40 mg daily this admission. Goal LDL 40-70  - A1c within goal <7.0  - Goal BP <140/90 with tighter control associated with decreased overall CV risk, if tolerated. Discussed the importance of home BP monitoring and keeping a log for PCP.  - Therapies  - PLC stroke education  - Discussed smoking cessation  - Discharge with 30 day cardiac event monitor to evaluate for atrial fibrillation.  - Reports that he has been told he sometimes stops breathing in his sleep. Recommend referral for sleep medicine to evaluate for sleep apnea     Patient Follow-up    - in the next 1-2 week(s) with PCP  - in 6-8 weeks with general neurology ([phone])  - In 12 weeks with neuro IR/neurosurgery to establish care for surveillance of incidental aneurysm    Thank you for this consult. No further stroke evaluation is recommended, so we will sign off. Please contact us with any additional questions.    Starla Longoria, CNP  Vascular Neurology    To page me or covering stroke neurology team member, click here: [facility]  Choose \"On Call\" tab at top, then select \"NEUROLOGY/ALL SITES\" from middle drop-down box, press Enter, then look for \"stroke\" or \"telestroke\" for your site.    _____________________________________________________    Clinically Significant Risk Factors                                  Past Medical History   No past medical history on file.  Past Surgical History   No past surgical history on file.  Medications   Home Meds  Prior to Admission medications    Not on File       Scheduled Meds    aspirin  325 mg Oral Daily    Or     aspirin  324 mg Oral or NG Tube Daily    Or     aspirin  300 mg Rectal Daily     atorvastatin  40 mg Oral QPM     lisinopril-hydrochlorothiazide  1 tablet Oral Daily     sodium chloride (PF)  3 mL Intracatheter Q8H     tamsulosin  0.4 mg Oral Daily       Infusion Meds    - " MEDICATION INSTRUCTIONS -       - MEDICATION INSTRUCTIONS -         PRN Meds  acetaminophen, lidocaine 4%, lidocaine (buffered or not buffered), - MEDICATION INSTRUCTIONS -, - MEDICATION INSTRUCTIONS -, ondansetron **OR** ondansetron, sodium chloride (PF)    Allergies   No Known Allergies  Family History   No family history on file.  Social History        Review of Systems   The 10 point Review of Systems is negative other than noted in the HPI or here.        PHYSICAL EXAMINATION   Temp:  [96.8  F (36  C)-98.6  F (37  C)] 98.6  F (37  C)  Pulse:  [63-74] 63  Resp:  [16-20] 18  BP: (105-185)/() 137/76  SpO2:  [91 %-97 %] 93 %    Neuro Exam  Mental Status:  alert, oriented x 3, follows commands, speech clear and fluent, naming and repetition normal  Cranial Nerves:  visual fields intact (tested by nurse), EOMI with normal smooth pursuit, facial sensation intact and symmetric (tested by nurse), hearing not formally tested but intact to conversation, shoulder shrug equal bilaterally, tongue protrusion midline, R facial droop, mild dysarthria  Motor:  no abnormal movements, able to move all limbs antigravity spontaneously with no signs of hemiparesis observed, no pronator drift  Reflexes:  unable to test (telestroke)  Sensory:  light touch sensation intact and symmetric throughout upper and lower extremities (assessed by nurse), no extinction on double simultaneous stimulation (assessed by nurse)  Coordination:  mild ataxia with RLE HTS  Station/Gait:  unable to test due to telestroke    Dysphagia Screen  Dysarthria or facial droop present - Maintain NPO, consult SLP    Stroke Scales    NIHSS  1a. Level of Consciousness 0-->Alert, keenly responsive   1b. LOC Questions 0-->Answers both questions correctly   1c. LOC Commands 0-->Performs both tasks correctly   2.   Best Gaze 0-->Normal   3.   Visual 0-->No visual loss   4.   Facial Palsy 1-->Minor paralysis (flattened nasolabial fold, asymmetry on smiling)   5a.  Motor Arm, Left 0-->No drift, limb holds 90 (or 45) degrees for full 10 secs   5b. Motor Arm, Right 0-->No drift, limb holds 90 (or 45) degrees for full 10 secs   6a. Motor Leg, Left 0-->No drift, leg holds 30 degree position for full 5 secs   6b. Motor Leg, right 0-->No drift, leg holds 30 degree position for full 5 secs   7.   Limb Ataxia 1-->Present in one limb   8.   Sensory 0-->Normal, no sensory loss   9.   Best Language 0-->No aphasia, normal   10. Dysarthria 1-->Mild-to-moderate dysarthria, patient slurs at least some words and, at worst, can be understood with some difficulty   11. Extinction and Inattention  0-->No abnormality   Total 3 (05/05/23 0917)       Modified Marycruz Score (Pre-morbid)  0-No deficits     Imaging  I personally reviewed all imaging; relevant findings per HPI.    Labs Data   CBC  Recent Labs   Lab 05/05/23  0512 05/04/23  1103   WBC 7.6 7.4   RBC 4.89 5.16   HGB 14.8 15.5   HCT 44.4 47.0    246     Basic Metabolic Panel   Recent Labs   Lab 05/05/23  1232 05/05/23  0819 05/05/23  0512 05/04/23  2223 05/04/23  1103   NA  --   --  135*  --  133*   POTASSIUM  --   --  3.8  --  4.4   CHLORIDE  --   --  101  --  98   CO2  --   --  26  --  27   BUN  --   --  18.8  --  18.4   CR  --   --  1.38*  --  1.29*   GLC 88 115* 97   < > 102*   MARCIA  --   --  8.7*  --  8.8    < > = values in this interval not displayed.     Liver Panel  Recent Labs   Lab 05/04/23  1103   PROTTOTAL 7.2   ALBUMIN 4.0   BILITOTAL 0.4   ALKPHOS 102   AST 25   ALT 17     INR  No lab results found.   Lipid Profile    Recent Labs   Lab Test 05/04/23  1103   CHOL 150   HDL 41   LDL 96   TRIG 67     A1C    Recent Labs   Lab Test 05/04/23  1103   A1C 5.9*     Troponin    Recent Labs   Lab 05/04/23  1103   CTROPT 22          Stroke Consult Data Data   Telestroke Service Details  (for non-emergent stroke consult with tele)  Video start time 05/05/23   0915   Video end time 05/05/23   0945   Type of service telemedicine  diagnostic assessment of acute neurological changes   Reason telemedicine is appropriate patient requires assessment with a specialist for diagnosis and treatment of neurological symptoms   Mode of transmission secure interactive audio and video communication per Stephon   Originating site (patient location) [facility]    Distant site (provider location) [facility]     I have personally spent a total of 55 minutes providing care today, time spent in reviewing medical records and reviewing tests, examining the patient and obtaining history, coordination of care, and discussion with the patient and/or family regarding diagnostic results, prognosis, symptom management, risks and benefits of management options, and development of plan of care. Greater than 50% was spent in counseling and coordination of care.

## 2023-05-05 NOTE — CONSULTS
Care Management Discharge Note    Discharge Date: 05/06/2023       Discharge Disposition:  Home    Discharge Services:      Discharge DME:      Discharge Transportation:      Private pay costs discussed: Not applicable    Handoff Referral Completed: No    Additional Information:  Consult placed to assist in establishing a new PCP for patient. He has been seen at [facility] in [place] recently. Met with patient and he would prefer this clinic as it is close to home and he does not drive.    [facility]  [address] [place] [address]    [phone]    Dr. Damian Yepez  May 12th 2023 at 11:15 arrive 15 minutes early    Information added the LUPILLO Ivy RN BSN OCN  Care Coordinator  [facility]  [phone]

## 2023-05-05 NOTE — PLAN OF CARE
Patient's After Visit Summary was reviewed with patient.   Patient verbalized understanding of After Visit Summary, recommended follow up and was given an opportunity to ask questions.   Discharge medications sent home with patient/family: YES   Discharged with cousin.     Vitals are Temp: 98.6  F (37  C) Temp src: Oral BP: 137/76 Pulse: 63   Resp: 18 SpO2: 93 %.  Patient is Alert and Oriented x4. They are SBA with no assistive devices . Pt is a Regular diet.  They are denying pain.  Patient has no IV access, removed prior to discharge. Facial symmetry remains slightly asymmetrical when smiling, other neuros intact. Cardiac event monitor placed. Speech evaluated and  cleared for regular diet, recommended outpatient services which patient refused d/t no transportation to appointments.

## 2023-05-05 NOTE — PHARMACY-CONSULT NOTE
Pharmacy Consult to evaluate for medication related stroke core measures    Abdifatah Zimmerman, 62 year old male admitted on 5/4/2023 for right facial droop and dysarthria present since 5/2/2023.     Thrombolytic was not given because of Clinical contraindications    VTE Prophylaxis SCDs /PCDs placed on 5/4/2023, as appropriate prior to end of hospital day 2.    Antithrombotic: aspirin started on 5/4/2023, as appropriate by end of hospital day 2. Continue antithrombotic therapy on discharge to meet quality measures, unless contraindicated.    Anticoagulation if history of A-fib/flutter: patient will discharge with 30 day cardiac event monitor to evaluate for atrial fibrillation.    LDL Cholesterol Calculated   Date Value Ref Range Status   05/04/2023 96 <=100 mg/dL Final       Patient currently receiving Lipitor (atorvastatin) continue statin on discharge to meet quality measures, unless contraindicated.    Recommendations: None at this time    Thank you for the consult.    Michelle Quinn [facility] 5/5/2023 1:41 PM   
CHEST PAIN

## 2023-05-05 NOTE — DISCHARGE SUMMARY
[facility]  Hospitalist Discharge Summary      Date of Admission:  5/4/2023  Date of Discharge:  5/5/2023  Discharging Provider: Richie Mancilla MD  Discharge Service: Hospitalist Service    Discharge Diagnoses    Dysarthria  Facial droop  Acute CVA    Follow-ups Needed After Discharge   Follow-up Appointments     Follow-up and recommended labs and tests       Follow up with primary care provider  [facility]  [id number] [place] [address]    [phone]    Dr. Damian Yepez  May 12th 2023 at 11:15 arrive 15 minutes early           Unresulted Labs Ordered in the Past 30 Days of this Admission     No orders found for last 31 day(s).      These results will be followed up by NA    Discharge Disposition   Discharged to home  Condition at discharge: Stable    Hospital Course   Abdifatah Zimmerman is a 62 year old male who who presents to the ED with right facial droop.  Patient states he developed a right facial droop with slurred speech on Tuesday.  He thought it would go away on its own and did not seek immediate medical attention.  He was seen in clinic today for his symptoms and referred to the ED for concerns of an acute stroke.  The patient denies vision loss, swallowing difficulties, or one-sided weakness.  He denies recent fever, chills, chest pain, palpitations, shortness of breath, abdominal pain, nausea, vomiting, diarrhea or dysuria.  He does note recent issues with incomplete bladder emptying and difficulty controlling his urine.  He has not sought medical care for 20 years so he has no diagnosed medical conditions.  Quite some time ago, he does endorse an episode of one-sided weakness that did cause him to fall.  He states his symptoms just resolved on their own and he did not seek medical attention at that time.  He notes a family history of coronary artery disease in his grandfather and lung cancer in his mother.  He is a smoker, he smokes 1 pack every 4  days.  He endorses a remote history of heavy alcohol use 20 years ago but now only rarely drinks.  In the ED, his blood pressure is elevated, 195/117, vital signs otherwise stable.  BMP shows a sodium of 133, creatinine of 1.29, otherwise unremarkable.  CBC is unremarkable.  A code stroke was called, CT of the head showed multiple small foci of hypoattenuation involving the bilateral basal ganglia regions in the cerebral white matter concerning for age-indeterminate ischemia or infarct.  CTA of the head and neck shows no high-grade stenosis or large vessel occlusion, a small possible aneurysm of the left internal carotid artery noted, 40-50% stenosis of the proximal right internal carotid artery noted, no significant stenosis of the left carotid artery.  CT head perfusion study does show apparent asymmetric prolongation of contrast transit in the superior aspect of the right cerebellar hemisphere on pretreatment imaging which is likely artifact but unable to rule out ischemia.  MRI of the brain was obtained which did show small acute to early subacute ischemic infarcts involving the deep left frontal white matter/corona radiata, several chronic infarcts also noted.  Stroke neurology was contacted and recommended full dose aspirin and no acute intervention required given symptoms have been present for greater than 48 hours.  He will be admitted to observation for further work-up and management of acute CVA.    1. Acute CVA  Developed R facial droop 2 days ago with slurred speech. Denies one sided weakness or vision change. CT head shows several areas concerning for age indeterminate infarcts. CTA head and neck is negative for high grade stenosis or large vessel occlusion, poss small aneurysm noted to L internal carotid artery, and 40-50% stenosis of the prox R internal carotid artery. CT head perfusion study also abnormal. Brain MRI obtained which does show small acute to subacute infarct to deep left frontal corona  radiata. MRI also shows multiple chronic infarcts.   Pt reports hx of one sided weakness causing a fall some time ago. This resolved on its own and he did not seek medical evaluation.   Blood pressure elevated here, suspect underlying HTN. Also a smoker, 1/4 pack per day. Hx of heavy alcohol use, little use in past 20 yrs. Cr elevated, suspect CKD.  - admit to Observation  - sx ongoing for >48 hrs, no indication for thrombolysis   - stroke neurology consult  - monitor on tele  - echocardiogram  - start full dose ASA per neurology recommendation   - start statin, add on lipid panel  - HgbA1c add on, 5.9.   - troponin within normal limits  - blood pressure control. Will start Lisinopril/HCTZ 20mg/12/5 mg, suspect Cr is chronic and at baseline. Will need close monitoring as an outpatient to ensure stability. Will give 1L of NS overnight in case Cr is acutely elevated and due to hyponatremia.   - PT/OT consults  - encourage smoking cessation   - will need to establish care with PCP, SW consult      2. GUERRERO vs CKD  Cr 1.29. pt has not sought medical care for over 20 yrs, does not endorse acute volume loss with diarrhea, vomiting or blood loss. BP elevated, suspect underlying HTN and hypertensive kidney disease.   - will give 1L NS in case this is acute and reversible.   - starting Lisinopril/HCTZ for HTN, monitor Cr closely  - avoid NSAIDs  - encourage smoking cessation      3. HTN  Suspect underlying HTN. Sx have been ongoing for 2 days, no need for permissive HTN in setting of acute stroke.   - start Lisinopril/HCTZ 20/12.5 (starting with lower dose of hydrochlorothiazide due to hyponatremia). Will likely need titration of antihypertensive as an outpatient   4. Hyponatremia  Will give 1L NS over 10 hrs, recheck in AM  5. Suspect BPH  Pt endorses hx of incomplete emptying and difficulty with urinary control. Suspect BPH. Pt willing to start Flomax now, which would also help with HTN, but understands he needs formal  evaluation and work up with PCP as an outpatient.   - start Flomax  - PVRs  6. Tobacco use  Smokes 1/4 pack per day. Encourage cessation. Declines nicotine patch    Patient today is doing well.  He still has a facial droop and some slurred speech.  He is eager to discharge home.  He will be on a full dose aspirin.  We have started him on blood pressure medications and a statin.  This can be titrated up as an outpatient.  We have provided him with a primary care doctor appointment.  He will have a cardiac event monitor placed.  He is not willing to stay to be seen by speech therapy.  I have placed an outpatient consult.  I offered to call family.  He declined.  Patient will discharge home.    Consultations This Hospital Stay   PATIENT LEARNING CENTER IP CONSULT  NEUROLOGY IP STROKE CONSULT  SPEECH LANGUAGE PATH ADULT IP CONSULT  PHARMACY IP CONSULT  PHARMACY IP CONSULT  PHARMACY IP CONSULT  PHYSICAL THERAPY ADULT IP CONSULT  OCCUPATIONAL THERAPY ADULT IP CONSULT  REHAB ADMISSIONS LIAISON IP CONSULT  CARE MANAGEMENT / SOCIAL WORK IP CONSULT  CARE MANAGEMENT / SOCIAL WORK IP CONSULT  SMOKING CESSATION PROGRAM IP CONSULT    Code Status   Full Code    Time Spent on this Encounter   I, Richie Mancilla MD, personally saw the patient today and spent greater than 30 minutes discharging this patient.       Richie Mancilla MD  [facility] OBSERVATION DEPT  [address] [place] [address]  Phone: [phone]  ______________________________________________________________________    Physical Exam   Vital Signs: Temp: 98.6  F (37  C) Temp src: Oral BP: 137/76 Pulse: 63   Resp: 18 SpO2: 93 % O2 Device: None (Room air)    Weight: 179 lbs 10.8 oz  Constitutional: awake, alert, cooperative, no apparent distress, and appears stated age  Eyes: Lids and lashes normal, pupils equal, round and reactive to light, extra ocular muscles intact, sclera clear, conjunctiva normal  ENT: facial droop, slurred  speech  Respiratory: No increased work of breathing, good air exchange, clear to auscultation bilaterally, no crackles or wheezing  Cardiovascular: Normal apical impulse, regular rate and rhythm, normal S1 and S2, no S3 or S4, and no murmur noted  GI: No scars, normal bowel sounds, soft, non-distended, non-tender, no masses palpated, no hepatosplenomegally  Skin: no bruising or bleeding       Primary Care Physician   [facility]    Discharge Orders      Speech Therapy Referral      Reason for your hospital stay    Slurred speech, facial droop.  Acute stroke  Hypertension     Follow-up and recommended labs and tests     Follow up with primary care provider  [facility]  [address] [place] [address]    [phone]    Dr. Damian Yepez  May 12th 2023 at 11:15 arrive 15 minutes early     Activity    Your activity upon discharge: activity as tolerated     Discharge Instructions    You need to stop smoking  This is a risk factor for having another stroke     Diet    Follow this diet upon discharge: Orders Placed This Encounter      Combination Diet Regular Diet       Significant Results and Procedures   Most Recent 3 CBC's:Recent Labs   Lab Test 05/05/23  0512 05/04/23  1103   WBC 7.6 7.4   HGB 14.8 15.5   MCV 91 91    246     Most Recent 3 BMP's:Recent Labs   Lab Test 05/05/23  1232 05/05/23  0819 05/05/23  0512 05/04/23  2223 05/04/23  1103   NA  --   --  135*  --  133*   POTASSIUM  --   --  3.8  --  4.4   CHLORIDE  --   --  101  --  98   CO2  --   --  26  --  27   BUN  --   --  18.8  --  18.4   CR  --   --  1.38*  --  1.29*   ANIONGAP  --   --  8  --  8   MARCIA  --   --  8.7*  --  8.8   GLC 88 115* 97   < > 102*    < > = values in this interval not displayed.     Most Recent 2 LFT's:Recent Labs   Lab Test 05/04/23  1103   AST 25   ALT 17   ALKPHOS 102   BILITOTAL 0.4   ,   Results for orders placed or performed during the hospital encounter of 05/04/23   Head CT w/o contrast     Narrative    CT SCAN OF THE HEAD WITHOUT CONTRAST May 4, 2023 10:15 AM     HISTORY: Stroke.    TECHNIQUE: Axial images of the head and coronal reformations without  IV contrast material. Radiation dose for this scan was reduced using  automated exposure control, adjustment of the mA and/or kV according  to patient size, or iterative reconstruction technique.    COMPARISON: None.    FINDINGS: The ventricles are normal in size and configuration.  Multiple small foci of hypoattenuation are seen in the bilateral basal  ganglia region and also involving the corona radiata and  retrolenticular right internal capsule region, concerning for  age-indeterminate infarcts. Couple of small areas of hypoattenuation  also noted involving the bilateral corona radiata/centrum semiovale  (series 3 image 20), concerning for age-indeterminate ischemic  change/infarct. Elsewhere, mild patchy hypoattenuation in the cerebral  white matter likely representing chronic small vessel ischemic  disease. Mild generalized brain atrophy. No acute intracranial  hemorrhage, extra axial fluid collection, or mass effect.    Mild mucosal thickening in the paranasal sinuses. The mastoid air  cells are clear. The calvarium appears intact.      Impression    IMPRESSION:  1. Multiple small foci of hypoattenuation involving the bilateral  basal ganglia regions and the cerebral white matter, as described,  concerning for age-indeterminate ischemia/infarcts. MRI is recommended  for further evaluation.  2. Brain atrophy and presumed chronic small vessel ischemic change, as  described.  3. No acute intracranial hemorrhage, extra axial fluid collection, or  mass effect.    KISHAN HEATH MD         SYSTEM ID:  [id number]   CTA Head Neck with Contrast    Narrative    CT ANGIOGRAM OF THE HEAD AND NECK WITH CONTRAST  5/4/2023 10:19 AM     HISTORY: Code stroke.    TECHNIQUE: CT angiography with an injection of 125mL Isovue-370 (CTA  Head Neck 75ml and Perfusion 50ml,  accession numbers [id number] and  [id number]) IV with scans through the head and neck. Images were  transferred to a separate 3-D workstation where multiplanar  reformations and 3-D images were created. Estimates of carotid  stenoses are made relative to the distal internal carotid artery  diameters except as noted. Radiation dose for this scan was reduced  using automated exposure control, adjustment of the mA and/or kV  according to patient size, or iterative reconstruction technique.     Perfusion scans were performed with injection of additional IV  contrast. These images were processed on a separate 3-D workstation.     COMPARISON: CT head same date.     CT ANGIOGRAM HEAD FINDINGS: The intracranial internal carotid  arteries, the major proximal branches of the anterior cerebral and  middle cerebral arteries, the vertebral arteries, the basilar artery,  and the major proximal branches of the posterior cerebral arteries  appear patent without flow-limiting stenosis. No large vessel  occlusion identified.    Small inferomedially directed outpouching involving the mid cavernous  segment of the left internal carotid artery measuring approximately  2-3 mm in height, concerning for possible small saccular aneurysm. No  other findings concerning for intracranial aneurysm or high flow  vascular malformation. Expected enhancement of the major dural venous  sinuses.    CT ANGIOGRAM NECK FINDINGS:   Conventional three-vessel aortic arch branching pattern. The origins  of the brachiocephalic and left common carotid arteries are not  included within the field-of-view. The origin of the left subclavian  artery is patent. The proximal visualized aspects of the  brachiocephalic and left common carotid arteries are patent.    Right carotid artery: There is atherosclerosis involving the carotid  bifurcation and proximal internal carotid artery contributing to  approximately 40-50% stenosis of the proximal internal carotid  artery  by NASCET criteria.    Left carotid artery: The left common and internal carotid arteries are  patent. Mild atherosclerotic disease at the carotid bifurcation and  proximal internal carotid artery without significant stenosis by  NASCET criteria.     Vertebral arteries: Vertebral arteries are patent without evidence of  dissection. No significant stenosis.     Other findings: Partially visualized centrilobular and paraseptal  emphysema involving the bilateral upper pulmonary lobes. Multilevel  degenerative changes in the spine.    CT HEAD PERFUSION FINDINGS: There are apparent artifacts in the  posterior fossa that somewhat limit evaluation. Apparent asymmetric  prolongation of contrast transit to the superior aspect of the right  cerebellar hemisphere is favored to be artifact. Otherwise, no  definite focal or regional cerebral perfusion asymmetry is identified.      Impression    IMPRESSION:     CTA HEAD:  1. No high-grade stenosis or large vessel occlusion involving the  major intracranial arteries.  2. Small (approximately 2-3 mm) inferomedially directed focal  outpouching of the cavernous left internal carotid artery, concerning  for possible small aneurysm.    CTA NECK:  1. Approximately 40-50% stenosis of the proximal right internal  carotid artery by NASCET criteria due to atherosclerosis.  2. Atherosclerosis of the left carotid bifurcation without significant  stenosis.  3. Patent cervical vertebral arteries without significant stenosis.  4. Emphysema.    CT HEAD PERFUSION:  1. Apparent asymmetric prolongation of contrast transit in the  superior aspect of the right cerebellar hemisphere on perfusion  imaging, thought most likely be artifact. Ischemia less likely.  Perfusion imaging otherwise unremarkable.    Findings discussed by myself with Dr. Snell at 10:40 AM on 5/4/2023.    KISHAN HEATH MD         SYSTEM ID:  [id number]   CT Head Perfusion w Contrast    Narrative    CT ANGIOGRAM OF THE HEAD  AND NECK WITH CONTRAST  5/4/2023 10:19 AM     HISTORY: Code stroke.    TECHNIQUE: CT angiography with an injection of 125mL Isovue-370 (CTA  Head Neck 75ml and Perfusion 50ml, accession numbers [id number] and  [id number]) IV with scans through the head and neck. Images were  transferred to a separate 3-D workstation where multiplanar  reformations and 3-D images were created. Estimates of carotid  stenoses are made relative to the distal internal carotid artery  diameters except as noted. Radiation dose for this scan was reduced  using automated exposure control, adjustment of the mA and/or kV  according to patient size, or iterative reconstruction technique.     Perfusion scans were performed with injection of additional IV  contrast. These images were processed on a separate 3-D workstation.     COMPARISON: CT head same date.     CT ANGIOGRAM HEAD FINDINGS: The intracranial internal carotid  arteries, the major proximal branches of the anterior cerebral and  middle cerebral arteries, the vertebral arteries, the basilar artery,  and the major proximal branches of the posterior cerebral arteries  appear patent without flow-limiting stenosis. No large vessel  occlusion identified.    Small inferomedially directed outpouching involving the mid cavernous  segment of the left internal carotid artery measuring approximately  2-3 mm in height, concerning for possible small saccular aneurysm. No  other findings concerning for intracranial aneurysm or high flow  vascular malformation. Expected enhancement of the major dural venous  sinuses.    CT ANGIOGRAM NECK FINDINGS:   Conventional three-vessel aortic arch branching pattern. The origins  of the brachiocephalic and left common carotid arteries are not  included within the field-of-view. The origin of the left subclavian  artery is patent. The proximal visualized aspects of the  brachiocephalic and left common carotid arteries are patent.    Right carotid artery: There is  atherosclerosis involving the carotid  bifurcation and proximal internal carotid artery contributing to  approximately 40-50% stenosis of the proximal internal carotid artery  by NASCET criteria.    Left carotid artery: The left common and internal carotid arteries are  patent. Mild atherosclerotic disease at the carotid bifurcation and  proximal internal carotid artery without significant stenosis by  NASCET criteria.     Vertebral arteries: Vertebral arteries are patent without evidence of  dissection. No significant stenosis.     Other findings: Partially visualized centrilobular and paraseptal  emphysema involving the bilateral upper pulmonary lobes. Multilevel  degenerative changes in the spine.    CT HEAD PERFUSION FINDINGS: There are apparent artifacts in the  posterior fossa that somewhat limit evaluation. Apparent asymmetric  prolongation of contrast transit to the superior aspect of the right  cerebellar hemisphere is favored to be artifact. Otherwise, no  definite focal or regional cerebral perfusion asymmetry is identified.      Impression    IMPRESSION:     CTA HEAD:  1. No high-grade stenosis or large vessel occlusion involving the  major intracranial arteries.  2. Small (approximately 2-3 mm) inferomedially directed focal  outpouching of the cavernous left internal carotid artery, concerning  for possible small aneurysm.    CTA NECK:  1. Approximately 40-50% stenosis of the proximal right internal  carotid artery by NASCET criteria due to atherosclerosis.  2. Atherosclerosis of the left carotid bifurcation without significant  stenosis.  3. Patent cervical vertebral arteries without significant stenosis.  4. Emphysema.    CT HEAD PERFUSION:  1. Apparent asymmetric prolongation of contrast transit in the  superior aspect of the right cerebellar hemisphere on perfusion  imaging, thought most likely be artifact. Ischemia less likely.  Perfusion imaging otherwise unremarkable.    Findings discussed by  myself with Dr. Snell at 10:40 AM on 5/4/2023.    KISHAN HEATH MD         SYSTEM ID:  [id number]   MR Brain w/o Contrast    Narrative    MRI BRAIN WITHOUT CONTRAST May 4, 2023 2:20 PM    HISTORY: Stroke.    TECHNIQUE: Multiplanar, multisequence MRI of the brain without  gadolinium IV contrast material.      COMPARISON: CT of the head 5/4/2023.    FINDINGS: Small area of restricted diffusion with corresponding T2  FLAIR hyperintense signal involving the deep left frontal white  matter/corona radiata, consistent with an acute/early subacute  ischemic infarct. This measures 17 mm x 9 mm in axial plane. No other  areas of recent infarct identified.    The ventricles are normal in size and configuration. Mild generalized  brain parenchymal volume loss. Small foci of chronic ischemic  change/chronic infarcts in the bilateral basal ganglia region. There  may also be mildly prominent dilated perivascular spaces in the  bilateral basal ganglia region. Small area of presumed chronic  ischemic change in the right thalamus. Mild scattered patchy foci of  T2 FLAIR hyperintense signal elsewhere in the cerebral white matter  and mild to moderate patchy signal changes in the alma rosa, presumably  related to chronic small vessel ischemic disease. No intracranial  hemorrhage, extra axial fluid collection, or mass effect/herniation.  The major arterial flow voids of the skull base are grossly  maintained.    Mild to moderate mucosal thickening in the paranasal sinuses, most  pronounced in the ethmoid regions. The calvarium, skull base, and  midface otherwise appear unremarkable.      Impression    IMPRESSION:  1. Small acute/early subacute ischemic infarct involving the deep left  frontal white matter/corona radiata. No acute intracranial hemorrhage  or significant mass effect.  2. Brain atrophy and chronic ischemic changes/infarcts, as described.    KISHAN HEATH MD         SYSTEM ID:  [id number]   Echocardiogram Complete - For age >  60 yrs     Value    LVEF  55-60%    [facility]    [id number]  [id number]  [id number]  [id number]^CRYSTAL^CALVIN^KRISTEL     [facility]  Echocardiography Laboratory  [address] [place] [address]     Name: PEYMAN MAE  MRN: [id number]  : 1960  Study Date: 2023 02:58 PM  Age: 62 yrs  Gender: Male  Patient Location: [facility]  Reason For Study: Cerebrovascular Incident  Ordering Physician: CALVIN ROJAS  Performed By: Miguelina Washington     BSA: 2.1 m2  Height: 72 in  Weight: 190 lb  BP: 163/109 mmHg  ______________________________________________________________________________  Procedure  Complete Portable Echo Adult.  ______________________________________________________________________________  Interpretation Summary     Bubble study not performed  Proximal septal thickening is noted.  Normal transthoracic echocardiogram.  ______________________________________________________________________________  Left Ventricle  The left ventricle is normal in size. Proximal septal thickening is noted. The  visual ejection fraction is 55-60%. Left ventricular diastolic function is  normal. Normal left ventricular wall motion. There is no thrombus seen in the  left ventricle.     Right Ventricle  The right ventricle is normal in size and function.     Atria  Normal left atrial size. Borderline right atrial enlargement. There is no  color Doppler evidence of an atrial shunt. Bubble study not performed.     Mitral Valve  There is trace mitral regurgitation.     Tricuspid Valve  Normal tricuspid valve. Right ventricular systolic pressure could not be  approximated due to inadequate tricuspid regurgitation. IVC diameter <2.1 cm  collapsing >50% with sniff suggests a normal RA pressure of 3 mmHg.     Aortic Valve  Normal tricuspid aortic valve.     Pulmonic Valve  The pulmonic valve is not well seen, but is grossly normal.     Vessels  Normal size aorta.     Pericardium  The pericardium appears normal.      Rhythm  Sinus rhythm was noted.  ______________________________________________________________________________  MMode/2D Measurements & Calculations  IVSd: 0.91 cm  LVIDd: 3.9 cm  LVIDs: 2.6 cm  LVPWd: 0.73 cm  FS: 32.5 %  LV mass(C)d: 92.6 grams  LV mass(C)dI: 44.4 grams/m2  Ao root diam: 3.6 cm  asc Aorta Diam: 3.4 cm  LVOT diam: 2.1 cm  LVOT area: 3.6 cm2  LA Volume (BP): 48.1 ml  LA Volume Index (BP): 23.1 ml/m2     RWT: 0.38     Doppler Measurements & Calculations  MV E max toya: 40.4 cm/sec  MV A max toya: 72.2 cm/sec  MV E/A: 0.56  MV dec slope: 145.2 cm/sec2  MV dec time: 0.28 sec  E/E' av.0  Lateral E/e': 3.4  Medial E/e': 4.6     ______________________________________________________________________________  Report approved by: Nisha Kaur 2023 04:27 PM               Discharge Medications   Current Discharge Medication List      START taking these medications    Details   aspirin (ASA) 325 MG EC tablet Take 1 tablet (325 mg) by mouth daily  Qty: 60 tablet, Refills: 1    Associated Diagnoses: Cerebrovascular accident (CVA), unspecified mechanism (H)      atorvastatin (LIPITOR) 40 MG tablet Take 0.5 tablets (20 mg) by mouth every evening  Qty: 60 tablet, Refills: 1    Associated Diagnoses: Cerebrovascular accident (CVA), unspecified mechanism (H)      lisinopril-hydrochlorothiazide (ZESTORETIC) 10-12.5 MG tablet Take 1 tablet by mouth daily  Qty: 60 tablet, Refills: 1    Associated Diagnoses: Benign essential hypertension      tamsulosin (FLOMAX) 0.4 MG capsule Take 1 capsule (0.4 mg) by mouth daily  Qty: 30 capsule, Refills: 1    Associated Diagnoses: Benign prostatic hyperplasia with lower urinary tract symptoms, symptom details unspecified           Allergies   No Known Allergies

## 2023-05-05 NOTE — CONSULTS
Stroke Education Note    The following information has been reviewed with the patient:    1. Warning signs of stroke    2. Calling 911 if having warning signs of stroke    3. All modifiable risk factors: hypertension, CAD, atrial fib, diabetes, hypercholesterolemia, smoking, substance abuse, diet, physical inactivity, obesity, sleep apnea.    4. Patient's risk factors for stroke which include: hypertension, smoking    5. Follow-up plan for after discharge    6. Discharge medications which include: lisinopril/hydrochlorothiazide, aspirin, Lipitor    In addition, the above information was given to the patient in writing as a part of the [facility] Stroke Class Handout.    Learner's response to risk factors / lifestyle modification education: Reasons to change Taking steps     Nell Urban RN

## 2023-05-05 NOTE — PLAN OF CARE
PRIMARY DIAGNOSIS: STROKE  OUTPATIENT/OBSERVATION GOALS TO BE MET BEFORE DISCHARGE:  1. Orthostatic performed: N/A    2. Diagnostic testing complete & at baseline neurologic testing: No    3. Cleared by consultants (if involved): No    4. Interpretation of cardiac rhythm per telemetry tech: SB 55  5. Tolerating adequate PO diet and medications: Yes    6. Return to near baseline physical activity or neurologic status: Yes    Discharge Planner Nurse   Safe discharge environment identified: Yes  Barriers to discharge: No     /73 (BP Location: Right arm)   Pulse 68   Temp 97.8  F (36.6  C) (Oral)   Resp 17   Ht 1.829 m (6')   Wt 81.5 kg (179 lb 10.8 oz)   SpO2 92%   BMI 24.37 kg/m      Pt  A & O x 4.up with SBA, able to swallow and communicate, facial droop present and about 1mm eye swallen, neuro assessment with normal limit, provider paged due to oxygen sat at sleep with 85 to 89% without SOB when asked. Patient reposition and encourage to have a deep breath.patient walks to bathroom to pee, responding appropriately  to question. Saline locked.    PLAN; Neuro tele, SW, PT OT following       Entered by: Loki Shahid RN 05/05/2023 3:58 AM     Please review provider order for any additional goals.   Nurse to notify provider when observation goals have been met and patient is ready for discharge.

## 2023-05-05 NOTE — PLAN OF CARE
Temp: 97.9  F (36.6  C) Temp src: Oral BP: 114/62 Pulse: 69   Resp: 16 SpO2: 97 % O2 Device: None (Room air)       A&Ox4. Up SBA- for safety, steady on his feet. Mild right facial droop/ mild slurred speech. No swallowing issues, good appetite. No voiding issues. Voided 200, pvr 8, flomax started today. Denies pain. Plan- labs in am, monitor on tele- pt running SR HR in the 70s, neuro checks every 4 hours, neuro consult tomorrow, SW consult to establish primary care, PT/OT/Speech, pt learning tomorrow, full dose aspirin started, saline lock after 1L of fluid, lisinopril-hydrochlorothiazide started today.

## 2023-05-05 NOTE — PROGRESS NOTES
05/05/23 1400   Appointment Info   Signing Clinician's Name / Credentials (SLP) Elva Walker M.A. CCC-SLP   General Information   Onset of Illness/Injury or Date of Surgery 05/04/23   Referring Physician Monika Oliveira PA-C   Patient/Family Therapy Goal Statement (SLP) Pt wants to go back home today.   Pertinent History of Current Problem Pt is a 62 year old male who developed R facial droop 2 days ago with slurred speech.  MRI of the brain was obtained which did show small acute to early subacute ischemic infarcts involving the deep left frontal white matter/corona radiata, several chronic infarcts also noted. SLP consulted as part of stroke work up due to slurred speech and R facial weakness.   General Observations alert, mildly slurred speech noted (but intelligible to unfamiliar listener), unsure of baseline cognition but deficits suspected   Type of Evaluation   Type of Evaluation Swallow Evaluation   Oral Motor   Oral Musculature anomalies present   Structural Abnormalities none present   Dentition (Oral Motor)   Dentition (Oral Motor) dental appliance/dentures;edentulous   Dental Appliance/Denture (Oral Motor) upper  (loosely fitting denture on top, edentulous on bottom)   Facial Symmetry (Oral Motor)   Facial Symmetry (Oral Motor) right side impairment   Right Side Facial Asymmetry minimal impairment;moderate impairment   Lip Function (Oral Motor)   Lip Strength (Oral Motor) WFL   Tongue Function (Oral Motor)   Tongue Strength (Oral Motor) WNL   Tongue Coordination/Speed (Oral Motor) WNL   Tongue ROM (Oral Motor) WNL   Jaw Function (Oral Motor)   Jaw Function (Oral Motor) WNL   Cough/Swallow/Gag Reflex (Oral Motor)   Soft Palate/Velum (Oral Motor) WNL   Gag Reflex (Oral Motor) not tested   Volitional Throat Clear/Cough (Oral Motor) WNL   Volitional Swallow (Oral Motor) WNL   Vocal Quality/Secretion Management (Oral Motor)   Vocal Quality (Oral Motor) WFL   General Swallowing Observations   Past  History of Dysphagia no prior hx of dysphagia per chart review or pt report; pt reports eating regular food at home despite lack of dentition   Respiratory Support (General Swallowing Observations) none   Current Diet/Method of Nutritional Intake (General Swallowing Observations, NIS) regular diet;thin liquids (level 0)   Swallowing Evaluation Clinical swallow evaluation   Clinical Swallow Evaluation   Feeding Assistance no assistance needed   Clinical Swallow Evaluation Textures Trialed thin liquids;solid foods   Clinical Swallow Eval: Thin Liquid Texture Trial   Mode of Presentation, Thin Liquids cup;straw   Volume of Liquid or Food Presented 6 oz thin h20   Oral Phase of Swallow WFL   Oral Residue   (none)   Pharyngeal Phase of Swallow intact   Diagnostic Statement WNL with thin H20   Clinical Swallow Evaluation: Puree Solid Texture Trial   Mode of Presentation, Puree spoon;self-fed   Volume of Puree Presented 4 oz puree applesauce   Oral Phase, Puree WFL   Oral Residue, Puree   (none)   Pharyngeal Phase, Puree intact   Diagnostic Statement WNL with puree texture   Clinical Swallow Evaluation: Solid Food Texture Trial   Mode of Presentation self-fed   Volume Presented 3 consuelo crackers   Oral Phase impaired mastication  (slow mastication due to dentition)   Oral Residue   (none)   Pharyngeal Phase intact   Diagnostic Statement slow mastication due to dentition; but WFL and no direct signs or symptoms of aspiration   Esophageal Phase of Swallow   Patient reports or presents with symptoms of esophageal dysphagia No   Swallowing Recommendations   Diet Consistency Recommendations regular diet;thin liquids (level 0)   Mode of Delivery Recommendations bolus size, small   Medication Administration Recommendations, Swallowing (SLP) ok for pills with H20   Instrumental Assessment Recommendations instrumental evaluation not recommended at this time   General Therapy Interventions   Planned Therapy Interventions Dysphagia  "Treatment   Dysphagia treatment Instruction of safe swallow strategies   Clinical Impression   Criteria for Skilled Therapeutic Interventions Met (SLP Eval) Yes, treatment indicated   SLP Diagnosis mild oropharyngeal dysphagia   Risks & Benefits of therapy have been explained evaluation/treatment results reviewed   Clinical Impression Comments Clinical dysphagia eval completed per MD order. The patient presents with mild right facial weakness, resulting in mildly slurred speech, that does not appear to be impacting chewing/swallowing at this time. The patient is edentulous, upper denture is loosely fitting, and he does not have a lower denture. PO trials served as detailed above with recommendation for a regular texture diet and thin liquids. SLP will f/u for diet tolerance and speech language intervention. Recommend OP or HH SLP for slurred speech, but the pt informed SLP he would decline this as he does not drive and wouldn't want a therapist going in to his cousin's home. \"That's just not for me,\" when SLP encouraged tx for dysarthria.  Also question pt's cognitive skills, unsure of his baseline ability.   SLP Total Evaluation Time   Eval: oral/pharyngeal swallow function, clinical swallow Minutes (42936) 15   SLP Goals   Therapy Frequency (SLP Eval) 3 times/wk   SLP Predicted Duration/Target Date for Goal Attainment 05/11/23   SLP Goals Swallow   SLP: Safely tolerate diet without signs/symptoms of aspiration Regular diet;Thin liquids;With use of swallow precautions;Independently   SLP Discharge Planning   SLP Plan diet tolerance, speech-language eval   SLP Discharge Recommendation home with outpatient speech therapy;home with home care speech therapy   SLP Rationale for DC Rec able to safely swallow baseline diet level, will f/u for diet tolerance given new R facial weakness.  Rec speech-language evaluation if pt remains hospitalized or at next level of care (though the patient informed SLP he would decline " participation due to participation)   SLP Brief overview of current status  Recommend a regular texture diet and thin liquids. Pt may benefit from speech-language evaluation for mildly slurred speech at next level of care if the pt will participate. Also question cognition, although unsure of baseline cognitive skills.   Total Session Time   Total Session Time (sum of timed and untimed services) 15

## 2023-05-05 NOTE — DISCHARGE INSTRUCTIONS
New primary care and hospital follow up appointment:    [facility]  [id number] [place] [address] [phone]    Dr. Damian Yepez  May 12th 2023 at 11:15 arrive 15 minutes early

## 2023-05-06 NOTE — PLAN OF CARE
"Speech Language Therapy Discharge Summary    Reason for therapy discharge:    Discharged to home with recommendation for OP therapy; pt refused services    Progress towards therapy goal(s). See goals on Care Plan in [facility] electronic health record for goal details.  Goals not met.  Barriers to achieving goals:   discharge on same date as initial evaluation.    Therapy recommendation(s):    Continued therapy is recommended.  Rationale/Recommendations: per evaluation 5/5/23:    \"Recommend a regular texture diet and thin liquids. Pt may benefit from speech-language evaluation for mildly slurred speech at next level of care if the pt will participate. Also question cognition, although unsure of baseline cognitive skills.\"      Note: pt not seen for therapy this date.      "

## 2023-05-07 ENCOUNTER — PATIENT OUTREACH (OUTPATIENT)
Dept: CARE COORDINATION | Facility: CLINIC | Age: 63
End: 2023-05-07
Payer: COMMERCIAL

## 2023-05-07 NOTE — PROGRESS NOTES
[facility]    Background: Transitional Care Management program identified per system criteria and reviewed by [facility] team for possible outreach.    Assessment: Upon chart review, [facility] Team member will not proceed with patient outreach related to this episode of Transitional Care Management program due to reason below:    Patient has a follow up appointment with an appropriate provider on 05/05/2023 for hospital discharge.     Plan: Transitional Care Management episode addressed appropriately per reason noted above.          LENCHO Acosta  [facility] [place] [facility]    *Connected Care Resource Team does NOT follow patient ongoing. Referrals are identified based on internal discharge reports and the outreach is to ensure patient has an understanding of their discharge instructions.